# Patient Record
Sex: FEMALE | Race: BLACK OR AFRICAN AMERICAN | Employment: PART TIME | URBAN - NONMETROPOLITAN AREA
[De-identification: names, ages, dates, MRNs, and addresses within clinical notes are randomized per-mention and may not be internally consistent; named-entity substitution may affect disease eponyms.]

---

## 2017-01-03 ENCOUNTER — APPOINTMENT (OUTPATIENT)
Dept: GENERAL RADIOLOGY | Age: 42
End: 2017-01-03
Payer: MEDICARE

## 2017-01-03 ENCOUNTER — HOSPITAL ENCOUNTER (EMERGENCY)
Age: 42
Discharge: HOME | End: 2017-01-03
Attending: EMERGENCY MEDICINE
Payer: MEDICARE

## 2017-01-03 VITALS
RESPIRATION RATE: 16 BRPM | OXYGEN SATURATION: 100 % | SYSTOLIC BLOOD PRESSURE: 115 MMHG | HEIGHT: 63 IN | BODY MASS INDEX: 31.89 KG/M2 | HEART RATE: 74 BPM | WEIGHT: 180 LBS | TEMPERATURE: 98 F | DIASTOLIC BLOOD PRESSURE: 73 MMHG

## 2017-01-03 DIAGNOSIS — T14.8XXA MUSCLE STRAIN: Primary | ICD-10-CM

## 2017-01-03 PROCEDURE — 93005 ELECTROCARDIOGRAM TRACING: CPT

## 2017-01-03 PROCEDURE — 6360000002 HC RX W HCPCS: Performed by: EMERGENCY MEDICINE

## 2017-01-03 PROCEDURE — 6370000000 HC RX 637 (ALT 250 FOR IP): Performed by: EMERGENCY MEDICINE

## 2017-01-03 PROCEDURE — 99284 EMERGENCY DEPT VISIT MOD MDM: CPT

## 2017-01-03 PROCEDURE — 99282 EMERGENCY DEPT VISIT SF MDM: CPT | Performed by: EMERGENCY MEDICINE

## 2017-01-03 PROCEDURE — 96372 THER/PROPH/DIAG INJ SC/IM: CPT

## 2017-01-03 PROCEDURE — 71010 XR CHEST PORTABLE: CPT

## 2017-01-03 RX ORDER — TRIAMCINOLONE ACETONIDE 40 MG/ML
40 INJECTION, SUSPENSION INTRA-ARTICULAR; INTRAMUSCULAR ONCE
Status: COMPLETED | OUTPATIENT
Start: 2017-01-03 | End: 2017-01-03

## 2017-01-03 RX ORDER — HYDROCODONE BITARTRATE AND ACETAMINOPHEN 5; 325 MG/1; MG/1
2 TABLET ORAL ONCE
Status: COMPLETED | OUTPATIENT
Start: 2017-01-03 | End: 2017-01-03

## 2017-01-03 RX ORDER — CYCLOBENZAPRINE HCL 10 MG
10 TABLET ORAL 3 TIMES DAILY PRN
Qty: 15 TABLET | Refills: 0 | Status: SHIPPED | OUTPATIENT
Start: 2017-01-03

## 2017-01-03 RX ORDER — KETOROLAC TROMETHAMINE 30 MG/ML
60 INJECTION, SOLUTION INTRAMUSCULAR; INTRAVENOUS ONCE
Status: COMPLETED | OUTPATIENT
Start: 2017-01-03 | End: 2017-01-03

## 2017-01-03 RX ORDER — NAPROXEN 500 MG/1
500 TABLET ORAL 2 TIMES DAILY PRN
Qty: 10 TABLET | Refills: 0 | Status: SHIPPED | OUTPATIENT
Start: 2017-01-03

## 2017-01-03 RX ADMIN — HYDROCODONE BITARTRATE AND ACETAMINOPHEN 2 TABLET: 5; 325 TABLET ORAL at 15:20

## 2017-01-03 RX ADMIN — KETOROLAC TROMETHAMINE 60 MG: 30 INJECTION, SOLUTION INTRAMUSCULAR at 15:21

## 2017-01-03 RX ADMIN — TRIAMCINOLONE ACETONIDE 40 MG: 40 INJECTION, SUSPENSION INTRA-ARTICULAR; INTRAMUSCULAR at 15:21

## 2017-01-03 ASSESSMENT — ENCOUNTER SYMPTOMS
BACK PAIN: 1
VOMITING: 0
NAUSEA: 0
SHORTNESS OF BREATH: 0
COUGH: 0

## 2017-01-03 ASSESSMENT — PAIN DESCRIPTION - FREQUENCY: FREQUENCY: CONTINUOUS

## 2017-01-03 ASSESSMENT — PAIN DESCRIPTION - LOCATION: LOCATION: CHEST;ARM

## 2017-01-03 ASSESSMENT — PAIN DESCRIPTION - DESCRIPTORS: DESCRIPTORS: SPASM

## 2017-01-03 ASSESSMENT — PAIN DESCRIPTION - ORIENTATION: ORIENTATION: RIGHT

## 2017-01-03 ASSESSMENT — PAIN SCALES - GENERAL
PAINLEVEL_OUTOF10: 4
PAINLEVEL_OUTOF10: 4
PAINLEVEL_OUTOF10: 7

## 2017-01-03 ASSESSMENT — PAIN DESCRIPTION - PAIN TYPE: TYPE: ACUTE PAIN

## 2017-01-09 LAB
EKG P AXIS: 61 DEGREES
EKG P-R INTERVAL: 142 MS
EKG Q-T INTERVAL: 400 MS
EKG QRS DURATION: 82 MS
EKG QTC CALCULATION (BAZETT): 412 MS
EKG T AXIS: 53 DEGREES

## 2018-08-19 ENCOUNTER — HOSPITAL ENCOUNTER (EMERGENCY)
Age: 43
Discharge: HOME | End: 2018-08-19
Payer: MEDICARE

## 2018-08-19 VITALS
HEIGHT: 63 IN | DIASTOLIC BLOOD PRESSURE: 78 MMHG | TEMPERATURE: 98.6 F | BODY MASS INDEX: 27.64 KG/M2 | WEIGHT: 156 LBS | HEART RATE: 82 BPM | SYSTOLIC BLOOD PRESSURE: 122 MMHG | RESPIRATION RATE: 16 BRPM | OXYGEN SATURATION: 99 %

## 2018-08-19 DIAGNOSIS — Z32.01 POSITIVE PREGNANCY TEST: ICD-10-CM

## 2018-08-19 DIAGNOSIS — T78.40XA ALLERGIC REACTION, INITIAL ENCOUNTER: Primary | ICD-10-CM

## 2018-08-19 LAB
GONADOTROPIN, CHORIONIC (HCG) QUANT: 913 MIU/ML (ref 0–5.3)
HCG(URINE) PREGNANCY TEST: POSITIVE

## 2018-08-19 PROCEDURE — 99283 EMERGENCY DEPT VISIT LOW MDM: CPT | Performed by: NURSE PRACTITIONER

## 2018-08-19 PROCEDURE — 81025 URINE PREGNANCY TEST: CPT

## 2018-08-19 PROCEDURE — 84702 CHORIONIC GONADOTROPIN TEST: CPT

## 2018-08-19 PROCEDURE — 99282 EMERGENCY DEPT VISIT SF MDM: CPT

## 2018-08-19 PROCEDURE — 36415 COLL VENOUS BLD VENIPUNCTURE: CPT

## 2018-08-19 RX ORDER — METHYLPREDNISOLONE 4 MG/1
TABLET ORAL
Qty: 1 KIT | Refills: 0 | Status: SHIPPED | OUTPATIENT
Start: 2018-08-19 | End: 2018-09-09

## 2018-08-19 RX ORDER — SULFAMETHOXAZOLE AND TRIMETHOPRIM 800; 160 MG/1; MG/1
1 TABLET ORAL 2 TIMES DAILY
Qty: 20 TABLET | Refills: 0 | Status: SHIPPED | OUTPATIENT
Start: 2018-08-19 | End: 2018-08-19

## 2018-09-09 ASSESSMENT — ENCOUNTER SYMPTOMS
EYE ITCHING: 0
SORE THROAT: 0
WHEEZING: 0
COUGH: 0
CHEST TIGHTNESS: 0
STRIDOR: 0
SHORTNESS OF BREATH: 0
TROUBLE SWALLOWING: 0

## 2022-07-18 ENCOUNTER — APPOINTMENT (OUTPATIENT)
Dept: CT IMAGING | Age: 47
End: 2022-07-18
Payer: MEDICARE

## 2022-07-18 ENCOUNTER — HOSPITAL ENCOUNTER (EMERGENCY)
Age: 47
Discharge: HOME | End: 2022-07-18
Payer: MEDICARE

## 2022-07-18 VITALS
DIASTOLIC BLOOD PRESSURE: 83 MMHG | SYSTOLIC BLOOD PRESSURE: 139 MMHG | TEMPERATURE: 98.2 F | RESPIRATION RATE: 16 BRPM | HEART RATE: 78 BPM | OXYGEN SATURATION: 98 %

## 2022-07-18 DIAGNOSIS — N23 RENAL COLIC: Primary | ICD-10-CM

## 2022-07-18 LAB
ALBUMIN SERPL-MCNC: 4 G/DL (ref 3.5–5.2)
ALP BLD-CCNC: 112 U/L (ref 35–104)
ALT SERPL-CCNC: 28 U/L (ref 5–33)
ANION GAP SERPL CALCULATED.3IONS-SCNC: 9 MMOL/L (ref 7–19)
AST SERPL-CCNC: 32 U/L (ref 5–32)
BACTERIA: NEGATIVE /HPF
BASOPHILS ABSOLUTE: 0 K/UL (ref 0–0.2)
BASOPHILS RELATIVE PERCENT: 0.5 % (ref 0–1)
BILIRUB SERPL-MCNC: <0.2 MG/DL (ref 0.2–1.2)
BILIRUBIN URINE: NEGATIVE
BLOOD, URINE: ABNORMAL
BUN BLDV-MCNC: 11 MG/DL (ref 6–20)
C-REACTIVE PROTEIN: <0.3 MG/DL (ref 0–0.5)
CALCIUM SERPL-MCNC: 8.9 MG/DL (ref 8.6–10)
CHLORIDE BLD-SCNC: 104 MMOL/L (ref 98–111)
CLARITY: CLEAR
CO2: 25 MMOL/L (ref 22–29)
COLOR: YELLOW
CREAT SERPL-MCNC: 0.7 MG/DL (ref 0.5–0.9)
CRYSTALS, UA: ABNORMAL /HPF
EOSINOPHILS ABSOLUTE: 0.1 K/UL (ref 0–0.6)
EOSINOPHILS RELATIVE PERCENT: 1.4 % (ref 0–5)
EPITHELIAL CELLS, UA: 1 /HPF (ref 0–5)
GFR AFRICAN AMERICAN: >59
GFR NON-AFRICAN AMERICAN: >60
GLUCOSE BLD-MCNC: 94 MG/DL (ref 74–109)
GLUCOSE URINE: NEGATIVE MG/DL
GONADOTROPIN, CHORIONIC (HCG) QUANT: 0.1 MIU/ML (ref 0–5.3)
HCG QUALITATIVE: NEGATIVE
HCT VFR BLD CALC: 41 % (ref 37–47)
HEMOGLOBIN: 12.3 G/DL (ref 12–16)
HYALINE CASTS: 1 /HPF (ref 0–8)
IMMATURE GRANULOCYTES #: 0 K/UL
KETONES, URINE: NEGATIVE MG/DL
LEUKOCYTE ESTERASE, URINE: NEGATIVE
LIPASE: 29 U/L (ref 13–60)
LYMPHOCYTES ABSOLUTE: 1.5 K/UL (ref 1.1–4.5)
LYMPHOCYTES RELATIVE PERCENT: 22.8 % (ref 20–40)
MCH RBC QN AUTO: 27 PG (ref 27–31)
MCHC RBC AUTO-ENTMCNC: 30 G/DL (ref 33–37)
MCV RBC AUTO: 89.9 FL (ref 81–99)
MONOCYTES ABSOLUTE: 0.4 K/UL (ref 0–0.9)
MONOCYTES RELATIVE PERCENT: 6.5 % (ref 0–10)
NEUTROPHILS ABSOLUTE: 4.5 K/UL (ref 1.5–7.5)
NEUTROPHILS RELATIVE PERCENT: 68.6 % (ref 50–65)
NITRITE, URINE: NEGATIVE
PDW BLD-RTO: 13.2 % (ref 11.5–14.5)
PH UA: 6.5 (ref 5–8)
PLATELET # BLD: 217 K/UL (ref 130–400)
PMV BLD AUTO: 11.6 FL (ref 9.4–12.3)
POTASSIUM REFLEX MAGNESIUM: 3.8 MMOL/L (ref 3.5–5)
PROCALCITONIN: 0.04 NG/ML (ref 0–0.09)
PROTEIN UA: NEGATIVE MG/DL
RBC # BLD: 4.56 M/UL (ref 4.2–5.4)
RBC UA: 7 /HPF (ref 0–4)
SODIUM BLD-SCNC: 138 MMOL/L (ref 136–145)
SPECIFIC GRAVITY UA: 1.02 (ref 1–1.03)
TOTAL PROTEIN: 7.4 G/DL (ref 6.6–8.7)
UROBILINOGEN, URINE: 1 E.U./DL
WBC # BLD: 6.5 K/UL (ref 4.8–10.8)
WBC UA: 1 /HPF (ref 0–5)

## 2022-07-18 PROCEDURE — 74150 CT ABDOMEN W/O CONTRAST: CPT

## 2022-07-18 PROCEDURE — 86140 C-REACTIVE PROTEIN: CPT

## 2022-07-18 PROCEDURE — 74150 CT ABDOMEN W/O CONTRAST: CPT | Performed by: RADIOLOGY

## 2022-07-18 PROCEDURE — 84145 PROCALCITONIN (PCT): CPT

## 2022-07-18 PROCEDURE — 36415 COLL VENOUS BLD VENIPUNCTURE: CPT

## 2022-07-18 PROCEDURE — 85025 COMPLETE CBC W/AUTO DIFF WBC: CPT

## 2022-07-18 PROCEDURE — 6370000000 HC RX 637 (ALT 250 FOR IP): Performed by: PHYSICIAN ASSISTANT

## 2022-07-18 PROCEDURE — 83690 ASSAY OF LIPASE: CPT

## 2022-07-18 PROCEDURE — 84703 CHORIONIC GONADOTROPIN ASSAY: CPT

## 2022-07-18 PROCEDURE — 81001 URINALYSIS AUTO W/SCOPE: CPT

## 2022-07-18 PROCEDURE — 84702 CHORIONIC GONADOTROPIN TEST: CPT

## 2022-07-18 PROCEDURE — 80053 COMPREHEN METABOLIC PANEL: CPT

## 2022-07-18 PROCEDURE — 99284 EMERGENCY DEPT VISIT MOD MDM: CPT

## 2022-07-18 RX ORDER — TAMSULOSIN HYDROCHLORIDE 0.4 MG/1
0.4 CAPSULE ORAL ONCE
Status: COMPLETED | OUTPATIENT
Start: 2022-07-18 | End: 2022-07-18

## 2022-07-18 RX ORDER — TAMSULOSIN HYDROCHLORIDE 0.4 MG/1
0.4 CAPSULE ORAL DAILY
Qty: 30 CAPSULE | Refills: 0 | Status: SHIPPED | OUTPATIENT
Start: 2022-07-18

## 2022-07-18 RX ADMIN — TAMSULOSIN HYDROCHLORIDE 0.4 MG: 0.4 CAPSULE ORAL at 19:34

## 2022-07-18 ASSESSMENT — ENCOUNTER SYMPTOMS
ABDOMINAL DISTENTION: 0
ABDOMINAL PAIN: 0
SHORTNESS OF BREATH: 0
EYE DISCHARGE: 0
SORE THROAT: 0
RHINORRHEA: 0
PHOTOPHOBIA: 0
EYE PAIN: 0
COLOR CHANGE: 0
APNEA: 0
BACK PAIN: 0
COUGH: 0
NAUSEA: 0

## 2022-07-18 NOTE — ED PROVIDER NOTES
[facility] EMERGENCY DEPT  eMERGENCYdEPARTMENT eNCOUnter      Pt Name: Ritu Aldrich  MRN: [id number]  [place] 1975  Date of evaluation: 7/18/2022  Provider:ANKITA Cramer    CHIEF COMPLAINT       Chief Complaint   Patient presents with    Hematuria         HISTORY OF PRESENT ILLNESS  (Location/Symptom, Timing/Onset, Context/Setting, Quality, Duration, Modifying Factors, Severity.)   Ritu Aldrich is a 42 y.o. female who presents to the emergency department with complaints of hematuria and suprapubic pain multiple rounds of ABX. She had stones found within bilateral kidneys 7/12/22 on CT continues to hurt and bleed. She denies STD potential. Doesn't correlate with last menses. Has appt this Thursday with [facility]. Has been urinating through strainer no stone has passed. HPI    Nursing Notes were reviewed and I agree. REVIEW OF SYSTEMS    (2-9 systems for level 4, 10 or more for level 5)     Review of Systems   Constitutional:  Negative for activity change, appetite change, chills and fever. HENT:  Negative for congestion, postnasal drip, rhinorrhea and sore throat. Eyes:  Negative for photophobia, pain, discharge and visual disturbance. Respiratory:  Negative for apnea, cough and shortness of breath. Cardiovascular:  Negative for chest pain and leg swelling. Gastrointestinal:  Negative for abdominal distention, abdominal pain and nausea. Genitourinary:  Positive for dysuria, hematuria and pelvic pain. Negative for vaginal bleeding. Musculoskeletal:  Negative for arthralgias, back pain, joint swelling, neck pain and neck stiffness. Skin:  Negative for color change and rash. Neurological:  Negative for dizziness, syncope, facial asymmetry and headaches. Hematological:  Negative for adenopathy. Does not bruise/bleed easily. Psychiatric/Behavioral:  Negative for agitation, behavioral problems and confusion.        Except as noted above the remainder of the review of systems was reviewed and negative. PAST MEDICAL HISTORY     Past Medical History:   Diagnosis Date    Chronic back pain          SURGICAL HISTORY       Past Surgical History:   Procedure Laterality Date     SECTION      3    OVARIAN CYST REMOVAL           CURRENT MEDICATIONS       Discharge Medication List as of 2022  7:55 PM        CONTINUE these medications which have NOT CHANGED    Details   cyclobenzaprine (FLEXERIL) 10 MG tablet Take 1 tablet by mouth 3 times daily as needed (muscle pain), Disp-15 tablet, R-0      naproxen (NAPROSYN) 500 MG tablet Take 1 tablet by mouth 2 times daily as needed for Pain, Disp-10 tablet, R-0             ALLERGIES     Doxycycline and Other    FAMILY HISTORY     No family history on file. SOCIAL HISTORY       Social History     Socioeconomic History    Marital status: Single   Tobacco Use    Smoking status: Never    Smokeless tobacco: Never   Substance and Sexual Activity    Alcohol use: No    Drug use: No       SCREENINGS    Harris Coma Scale  Eye Opening: Spontaneous  Best Verbal Response: Oriented  Best Motor Response: Obeys commands  Heartwell Coma Scale Score: 15      PHYSICAL EXAM    (up to 7 forlevel 4, 8 or more for level 5)     ED Triage Vitals [22 1503]   BP Temp Temp Source Heart Rate Resp SpO2 Height Weight   139/83 98.2 °F (36.8 °C) Oral 78 16 98 % -- --       Physical Exam  Vitals and nursing note reviewed. Constitutional:       General: She is not in acute distress. Appearance: Normal appearance. She is well-developed. She is not diaphoretic. HENT:      Head: Normocephalic and atraumatic. Right Ear: Tympanic membrane, ear canal and external ear normal.      Left Ear: Tympanic membrane, ear canal and external ear normal.      Nose: Nose normal.      Mouth/Throat:      Mouth: Mucous membranes are moist.      Pharynx: No oropharyngeal exudate. Eyes:      General:         Right eye: No discharge. Left eye: No discharge. Pupils: Pupils are equal, round, and reactive to light. Neck:      Thyroid: No thyromegaly. Cardiovascular:      Rate and Rhythm: Normal rate and regular rhythm. Pulses: Normal pulses. Heart sounds: Normal heart sounds. No murmur heard. No friction rub. Pulmonary:      Effort: Pulmonary effort is normal. No respiratory distress. Breath sounds: Normal breath sounds. No stridor. No wheezing. Abdominal:      General: Abdomen is flat. Bowel sounds are normal. There is no distension. Palpations: Abdomen is soft. Tenderness: There is no abdominal tenderness. There is right CVA tenderness and left CVA tenderness. Comments: Suprapubic discomfort   Musculoskeletal:         General: Normal range of motion. Cervical back: Normal range of motion and neck supple. Skin:     General: Skin is warm and dry. Capillary Refill: Capillary refill takes less than 2 seconds. Findings: No rash. Neurological:      Mental Status: She is alert and oriented to person, place, and time. Cranial Nerves: No cranial nerve deficit. Sensory: No sensory deficit. Coordination: Coordination normal.   Psychiatric:         Mood and Affect: Mood normal.         Behavior: Behavior normal.         Thought Content: Thought content normal.         Judgment: Judgment normal.         DIAGNOSTIC RESULTS     RADIOLOGY:   Non-plain film images such as CT, Ultrasound and MRI are read by the radiologist. Plain radiographic images are visualized and preliminarilyinterpreted by No att. providers found with the below findings:      Interpretation per the Radiologist below, if available at the time of this note:    CT KIDNEY WO CONTRAST   Final Result   1. 6 mm nonobstructing left renal stone, lower pole   2. Punctate nonobstructing left renal stone, lower pole   3. The appendix is not visualized   Recommendation: Follow up as clinically indicated.    All CT scans at this facility utilize dose modulation, iterative reconstruction, and/or weight based dosing when appropriate to reduce radiation dose to as low as reasonably achievable. Electronically Signed by Annabel Esparza at 18-Jul-2022 08:23:17 PM                   LABS:  Labs Reviewed   CBC WITH AUTO DIFFERENTIAL - Abnormal; Notable for the following components:       Result Value    MCHC 30.0 (*)     Neutrophils % 68.6 (*)     All other components within normal limits   COMPREHENSIVE METABOLIC PANEL W/ REFLEX TO MG FOR LOW K - Abnormal; Notable for the following components:    Alkaline Phosphatase 112 (*)     All other components within normal limits   URINALYSIS WITH REFLEX TO CULTURE - Abnormal; Notable for the following components:    Blood, Urine TRACE (*)     All other components within normal limits   MICROSCOPIC URINALYSIS - Abnormal; Notable for the following components:    Bacteria, UA NEGATIVE (*)     Crystals, UA NEG (*)     RBC, UA 7 (*)     All other components within normal limits   LIPASE   HCG, QUANTITATIVE, PREGNANCY   PROCALCITONIN   C-REACTIVE PROTEIN   HCG, SERUM, QUALITATIVE       All other labs were within normal range or notreturned as of this dictation. RE-ASSESSMENT          EMERGENCY DEPARTMENT COURSE and DIFFERENTIAL DIAGNOSIS/MDM:   Vitals:    Vitals:    07/18/22 1503   BP: 139/83   Pulse: 78   Resp: 16   Temp: 98.2 °F (36.8 °C)   TempSrc: Oral   SpO2: 98%           MDM  Plan for flomax. 6mm stone within renal pole no other acute findings. She can continue with NSAIDS at home keep her appt for Thursday with urology as planned. No UTI findings today normal Cr BUN. PROCEDURES:    Procedures      FINAL IMPRESSION      1.  Renal colic          DISPOSITION/PLAN   DISPOSITION Decision To Discharge 07/18/2022 07:48:14 PM      PATIENT REFERRED TO:  [facility]  Giovany Palacio  [phone]    If symptoms worsen    DISCHARGE MEDICATIONS:  Discharge Medication List as of 7/18/2022  7:55 PM START taking these medications    Details   tamsulosin (FLOMAX) 0.4 MG capsule Take 1 capsule by mouth in the morning., Disp-30 capsule, R-0Normal             (Please note that portions of this note were completed with a voice recognition program.  Efforts were made to edit the dictations but occasionallywords are mis-transcribed.)    Madeline Mortimer, [address], [address]  07/19/22 [id number]

## 2022-07-22 PROBLEM — E66.811 CLASS 1 OBESITY DUE TO EXCESS CALORIES WITHOUT SERIOUS COMORBIDITY WITH BODY MASS INDEX (BMI) OF 31.0 TO 31.9 IN ADULT: Status: ACTIVE | Noted: 2022-07-22

## 2023-01-13 ENCOUNTER — TELEPHONE (OUTPATIENT)
Dept: INTERNAL MEDICINE | Facility: CLINIC | Age: 48
End: 2023-01-13

## 2023-01-13 NOTE — TELEPHONE ENCOUNTER
Patient was informed she states she will go to [facility] and have it done

## 2023-01-13 NOTE — TELEPHONE ENCOUNTER
Caller: Mercedes Wren    Relationship: Self    Best call back number: [phone]    What medication are you requesting: ANTIBIOTIC    What are your current symptoms: URINE HAS A SMELL, URINE IS CLOUDY, BURNING SENSATION     How long have you been experiencing symptoms: TWO DAYS    Have you had these symptoms before:    [x] Yes  [] No    Have you been treated for these symptoms before:   [x] Yes  [] No    If a prescription is needed, what is your preferred pharmacy and phone number: [facility] #[id number] - [place], [place] - [address]  [address]  - [phone] [facility] [phone]      Additional notes: PATIENT BELIEVES SHE HAS A UTI. PLEASE CALL BACK WHEN SOMETHING IS CALLED IN.

## 2023-04-03 ENCOUNTER — TELEPHONE (OUTPATIENT)
Dept: INTERNAL MEDICINE | Facility: CLINIC | Age: 48
End: 2023-04-03

## 2023-04-03 NOTE — TELEPHONE ENCOUNTER
Caller: Mercedes Wren    Relationship: Self    Best call back number: [phone]    What orders are you requesting (i.e. lab or imaging):   LAB    In what timeframe would the patient need to come in:   ASAP    Where will you receive your lab/imaging services:   [facility]     Additional notes:   PATIENT SAID IT IS BLOOD IN URINE.    PLEASE CONTACT PATIENT AND ADVISE ONCE ORDERS ENTERED.

## 2023-09-01 ENCOUNTER — TELEPHONE (OUTPATIENT)
Dept: INTERNAL MEDICINE | Facility: CLINIC | Age: 48
End: 2023-09-01
Payer: MEDICARE

## 2023-09-01 NOTE — TELEPHONE ENCOUNTER
PT CALLED STATING THAT SHE IS HAVING RIGHT HAND TINGLING, ALMOST ASLEEP., PAIN IN ELBOW AND WRIST. I ENCOURAGED HER TO GO TO URGENT CARE OR THE E D TO BE CHECKED.

## 2023-09-07 ENCOUNTER — OFFICE VISIT (OUTPATIENT)
Dept: INTERNAL MEDICINE | Facility: CLINIC | Age: 48
End: 2023-09-07
Payer: MEDICARE

## 2023-09-07 VITALS
BODY MASS INDEX: 23.83 KG/M2 | HEART RATE: 81 BPM | OXYGEN SATURATION: 98 % | DIASTOLIC BLOOD PRESSURE: 70 MMHG | HEIGHT: 63 IN | RESPIRATION RATE: 16 BRPM | SYSTOLIC BLOOD PRESSURE: 107 MMHG | WEIGHT: 134.5 LBS

## 2023-09-07 DIAGNOSIS — F41.0 PANIC ATTACKS: ICD-10-CM

## 2023-09-07 DIAGNOSIS — F41.1 GENERALIZED ANXIETY DISORDER: ICD-10-CM

## 2023-09-07 PROCEDURE — 1160F RVW MEDS BY RX/DR IN RCRD: CPT | Performed by: INTERNAL MEDICINE

## 2023-09-07 PROCEDURE — 99214 OFFICE O/P EST MOD 30 MIN: CPT | Performed by: INTERNAL MEDICINE

## 2023-09-07 PROCEDURE — 1159F MED LIST DOCD IN RCRD: CPT | Performed by: INTERNAL MEDICINE

## 2023-09-07 RX ORDER — BUSPIRONE HYDROCHLORIDE 10 MG/1
10 TABLET ORAL 2 TIMES DAILY
Qty: 120 TABLET | Refills: 1 | Status: SHIPPED | OUTPATIENT
Start: 2023-09-07 | End: 2023-09-07 | Stop reason: SDUPTHER

## 2023-09-07 RX ORDER — BUSPIRONE HYDROCHLORIDE 10 MG/1
20 TABLET ORAL 2 TIMES DAILY
Qty: 120 TABLET | Refills: 1 | Status: SHIPPED | OUTPATIENT
Start: 2023-09-07

## 2023-09-07 RX ORDER — VENLAFAXINE HYDROCHLORIDE 75 MG/1
75 CAPSULE, EXTENDED RELEASE ORAL DAILY
Qty: 30 CAPSULE | Refills: 3 | Status: SHIPPED | OUTPATIENT
Start: 2023-09-07

## 2023-09-07 NOTE — PROGRESS NOTES
CC: anxiety    History:  Mercedes Amezquita is a 48 y.o. female   She notes she has still had significant issues with anxiety that have affected her both at home and at work.  She has tolerated venlafaxine better than Cymbalta, but does not feel anxiety has improved much.  She has had some response with BuSpar in response to managing her panic attacks.  She remains open to seeing a counselor.      ROS:  Review of Systems   Psychiatric/Behavioral:  Negative for dysphoric mood. The patient is nervous/anxious.       reports that she has never smoked. She has never been exposed to tobacco smoke. She has never used smokeless tobacco. She reports that she does not drink alcohol and does not use drugs.      Current Outpatient Medications:     busPIRone (BUSPAR) 10 MG tablet, Take 1 tablet by mouth 2 (Two) Times a Day., Disp: 120 tablet, Rfl: 1    butalbital-acetaminophen-caffeine (FIORICET, ESGIC) -40 MG per tablet, Take 1 tablet by mouth As Needed., Disp: , Rfl:     diclofenac (VOLTAREN) 50 MG EC tablet, Take 1 tablet by mouth 2 (Two) Times a Day., Disp: , Rfl:     polyethylene glycol (MiraLax) 17 GM/SCOOP powder, Take 17 g by mouth Daily., Disp: 850 g, Rfl: 0    pregabalin (LYRICA) 50 MG capsule, Take 1 capsule by mouth 2 (Two) Times a Day., Disp: , Rfl:     Sennosides (Senna) 8.6 MG capsule, Take 17.2 mg by mouth 2 (Two) Times a Day As Needed (constipation)., Disp: 30 each, Rfl: 3    tamsulosin (FLOMAX) 0.4 MG capsule 24 hr capsule, Take 1 capsule by mouth Every Morning., Disp: , Rfl:     tiZANidine (ZANAFLEX) 4 MG tablet, Take 1 tablet by mouth Every 8 (Eight) Hours As Needed., Disp: , Rfl:     topiramate (TOPAMAX) 25 MG tablet, Take 1 tablet by mouth As Needed., Disp: , Rfl:     traMADol (ULTRAM) 50 MG tablet, Take 1 tablet by mouth As Needed., Disp: , Rfl:     venlafaxine XR (Effexor XR) 37.5 MG 24 hr capsule, Take 1 capsule by mouth Daily., Disp: 30 capsule, Rfl: 3    OBJECTIVE:  /70 (BP Location: Left arm,  "Patient Position: Sitting, Cuff Size: Adult)   Pulse 81   Resp 16   Ht 160 cm (63\")   Wt 61 kg (134 lb 8 oz)   LMP  (LMP Unknown)   SpO2 98%   BMI 23.83 kg/m²    Physical Exam  Constitutional:       General: She is not in acute distress.  Pulmonary:      Effort: Pulmonary effort is normal. No respiratory distress.   Neurological:      Mental Status: She is alert and oriented to person, place, and time.       Assessment/Plan     Diagnoses and all orders for this visit:    1. Generalized anxiety disorder  2. Panic attacks    -     Ambulatory Referral to Psychology  -     busPIRone (BUSPAR) 10 MG tablet; Take 2 tablets by mouth 2 (Two) Times a Day.  Dispense: 120 tablet; Refill: 1  -     venlafaxine XR (Effexor XR) 75 MG 24 hr capsule; Take 1 capsule by mouth Daily.  Dispense: 30 capsule; Refill: 3  ferral to Psychology  Increase effexor to 75mg and buspar to 20mg BID. Refer to counseling given no discernible improvement.     An After Visit Summary was printed and given to the patient at discharge.  Return in about 2 months (around 11/7/2023) for Recheck.      Krishna Villasenor D.O. 9/7/2023   Electronically signed.  "

## 2023-10-08 DIAGNOSIS — F41.1 GENERALIZED ANXIETY DISORDER: ICD-10-CM

## 2023-10-08 RX ORDER — VENLAFAXINE HYDROCHLORIDE 37.5 MG/1
37.5 CAPSULE, EXTENDED RELEASE ORAL DAILY
Qty: 30 CAPSULE | Refills: 1 | OUTPATIENT
Start: 2023-10-08

## 2023-11-08 ENCOUNTER — OFFICE VISIT (OUTPATIENT)
Dept: INTERNAL MEDICINE | Facility: CLINIC | Age: 48
End: 2023-11-08
Payer: MEDICARE

## 2023-11-08 VITALS
SYSTOLIC BLOOD PRESSURE: 125 MMHG | HEART RATE: 68 BPM | DIASTOLIC BLOOD PRESSURE: 85 MMHG | RESPIRATION RATE: 16 BRPM | HEIGHT: 63 IN | BODY MASS INDEX: 25.78 KG/M2 | OXYGEN SATURATION: 98 % | WEIGHT: 145.5 LBS

## 2023-11-08 DIAGNOSIS — F41.1 GENERALIZED ANXIETY DISORDER: Primary | ICD-10-CM

## 2023-11-08 PROCEDURE — 1159F MED LIST DOCD IN RCRD: CPT | Performed by: INTERNAL MEDICINE

## 2023-11-08 PROCEDURE — 1160F RVW MEDS BY RX/DR IN RCRD: CPT | Performed by: INTERNAL MEDICINE

## 2023-11-08 PROCEDURE — 99213 OFFICE O/P EST LOW 20 MIN: CPT | Performed by: INTERNAL MEDICINE

## 2023-11-08 RX ORDER — VENLAFAXINE HYDROCHLORIDE 75 MG/1
75 CAPSULE, EXTENDED RELEASE ORAL DAILY
Qty: 90 CAPSULE | Refills: 1 | Status: SHIPPED | OUTPATIENT
Start: 2023-11-08

## 2023-11-08 NOTE — PROGRESS NOTES
CC: f/u anxiety    History:  Mercedes Amezquita is a 48 y.o. female   She notes she has been doing much better. She has tolerated venlafaxine and feels that her anxiety has improved. She is now using buspirone only as needed. She has been grieving the loss of several folks, but is excited about the birth of her first granddaughter in January.      ROS:  Review of Systems   Psychiatric/Behavioral:  Negative for dysphoric mood. The patient is not nervous/anxious.         reports that she has never smoked. She has never been exposed to tobacco smoke. She has never used smokeless tobacco. She reports that she does not drink alcohol and does not use drugs.      Current Outpatient Medications:     busPIRone (BUSPAR) 10 MG tablet, Take 2 tablets by mouth 2 (Two) Times a Day., Disp: 120 tablet, Rfl: 1    butalbital-acetaminophen-caffeine (FIORICET, ESGIC) -40 MG per tablet, Take 1 tablet by mouth As Needed., Disp: , Rfl:     diclofenac (VOLTAREN) 50 MG EC tablet, Take 1 tablet by mouth 2 (Two) Times a Day., Disp: , Rfl:     polyethylene glycol (MiraLax) 17 GM/SCOOP powder, Take 17 g by mouth Daily., Disp: 850 g, Rfl: 0    pregabalin (LYRICA) 50 MG capsule, Take 1 capsule by mouth 2 (Two) Times a Day., Disp: , Rfl:     Sennosides (Senna) 8.6 MG capsule, Take 17.2 mg by mouth 2 (Two) Times a Day As Needed (constipation)., Disp: 30 each, Rfl: 3    tamsulosin (FLOMAX) 0.4 MG capsule 24 hr capsule, Take 1 capsule by mouth Every Morning., Disp: , Rfl:     tiZANidine (ZANAFLEX) 4 MG tablet, Take 1 tablet by mouth Every 8 (Eight) Hours As Needed., Disp: , Rfl:     topiramate (TOPAMAX) 25 MG tablet, Take 1 tablet by mouth As Needed., Disp: , Rfl:     traMADol (ULTRAM) 50 MG tablet, Take 1 tablet by mouth As Needed., Disp: , Rfl:     venlafaxine XR (Effexor XR) 75 MG 24 hr capsule, Take 1 capsule by mouth Daily., Disp: 30 capsule, Rfl: 3    OBJECTIVE:  /85 (BP Location: Left arm, Patient Position: Sitting, Cuff Size: Adult)   " Pulse 68   Resp 16   Ht 160 cm (63\")   Wt 66 kg (145 lb 8 oz)   LMP  (LMP Unknown)   SpO2 98%   BMI 25.77 kg/m²    Physical Exam  Constitutional:       General: She is not in acute distress.  Pulmonary:      Effort: Pulmonary effort is normal. No respiratory distress.   Neurological:      Mental Status: She is alert and oriented to person, place, and time.   Psychiatric:         Mood and Affect: Mood normal.         Behavior: Behavior normal.         Assessment/Plan     Diagnoses and all orders for this visit:    1. Generalized anxiety disorder (Primary)  -     venlafaxine XR (Effexor XR) 75 MG 24 hr capsule; Take 1 capsule by mouth Daily.  Dispense: 90 capsule; Refill: 1  Stable on Effexor, which is changed to a 90 day prescription. Continue prn buspar and can still consider counseling as this could still be very effective for her.       An After Visit Summary was printed and given to the patient at discharge.  Return in about 6 months (around 5/8/2024) for Recheck.      Krishna Villasenor D.O. 11/8/2023   Electronically signed.  "

## 2023-11-21 ENCOUNTER — APPOINTMENT (OUTPATIENT)
Dept: CT IMAGING | Age: 48
End: 2023-11-21
Payer: COMMERCIAL

## 2023-11-21 ENCOUNTER — APPOINTMENT (OUTPATIENT)
Dept: GENERAL RADIOLOGY | Age: 48
End: 2023-11-21
Payer: COMMERCIAL

## 2023-11-21 ENCOUNTER — HOSPITAL ENCOUNTER (EMERGENCY)
Age: 48
Discharge: HOME | End: 2023-11-21
Payer: COMMERCIAL

## 2023-11-21 VITALS
OXYGEN SATURATION: 100 % | DIASTOLIC BLOOD PRESSURE: 69 MMHG | BODY MASS INDEX: 22.68 KG/M2 | SYSTOLIC BLOOD PRESSURE: 125 MMHG | HEIGHT: 63 IN | WEIGHT: 128 LBS | TEMPERATURE: 98.7 F | HEART RATE: 78 BPM | RESPIRATION RATE: 17 BRPM
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
## 2023-11-21 DIAGNOSIS — S39.012A STRAIN OF LUMBAR REGION, INITIAL ENCOUNTER: ICD-10-CM

## 2023-11-21 LAB — HCG UR QL: NEGATIVE

## 2023-11-21 PROCEDURE — 70450 CT HEAD/BRAIN W/O DYE: CPT

## 2023-11-21 PROCEDURE — 99284 EMERGENCY DEPT VISIT MOD MDM: CPT

## 2023-11-21 PROCEDURE — 84703 CHORIONIC GONADOTROPIN ASSAY: CPT

## 2023-11-21 PROCEDURE — 73560 X-RAY EXAM OF KNEE 1 OR 2: CPT

## 2023-11-21 PROCEDURE — 73552 X-RAY EXAM OF FEMUR 2/>: CPT

## 2023-11-21 PROCEDURE — 72125 CT NECK SPINE W/O DYE: CPT

## 2023-11-21 PROCEDURE — 71045 X-RAY EXAM CHEST 1 VIEW: CPT

## 2023-11-21 PROCEDURE — 72131 CT LUMBAR SPINE W/O DYE: CPT

## 2023-11-21 PROCEDURE — 73030 X-RAY EXAM OF SHOULDER: CPT

## 2023-11-21 RX ORDER — CYCLOBENZAPRINE HCL 10 MG
10 TABLET ORAL 3 TIMES DAILY PRN
Qty: 21 TABLET | Refills: 0 | Status: SHIPPED | OUTPATIENT
Start: 2023-11-21 | End: 2023-12-01

## 2023-11-21 ASSESSMENT — ENCOUNTER SYMPTOMS
COUGH: 0
EYE DISCHARGE: 0
BACK PAIN: 1
SHORTNESS OF BREATH: 0
APNEA: 0
NAUSEA: 0
RHINORRHEA: 0
ABDOMINAL PAIN: 0
EYE PAIN: 0
SORE THROAT: 0
ABDOMINAL DISTENTION: 0
PHOTOPHOBIA: 0
COLOR CHANGE: 0
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
## 2023-11-21 NOTE — DISCHARGE INSTRUCTIONS
Tylenol motrin heating pad ice alternating  No acute findings on work up.   Plan for rest and 72 hrs observation

## 2023-12-08 ENCOUNTER — TRANSCRIBE ORDERS (OUTPATIENT)
Dept: PHYSICAL THERAPY | Facility: CLINIC | Age: 48
End: 2023-12-08
Payer: MEDICARE

## 2023-12-21 ENCOUNTER — TREATMENT (OUTPATIENT)
Dept: PHYSICAL THERAPY | Facility: CLINIC | Age: 48
End: 2023-12-21
Payer: MEDICARE

## 2023-12-21 DIAGNOSIS — S13.4XXS WHIPLASH INJURY TO NECK, SEQUELA: Primary | ICD-10-CM

## 2023-12-21 DIAGNOSIS — M54.6 THORACIC SPINE PAIN: ICD-10-CM

## 2023-12-21 DIAGNOSIS — M54.40 ACUTE BILATERAL LOW BACK PAIN WITH SCIATICA, SCIATICA LATERALITY UNSPECIFIED: ICD-10-CM
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
## 2023-12-22 ENCOUNTER — TELEPHONE (OUTPATIENT)
Dept: INTERNAL MEDICINE | Facility: CLINIC | Age: 48
End: 2023-12-22
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
## 2024-01-02 ENCOUNTER — TREATMENT (OUTPATIENT)
Dept: PHYSICAL THERAPY | Facility: CLINIC | Age: 49
End: 2024-01-02
Payer: MEDICARE

## 2024-01-02 DIAGNOSIS — M54.6 THORACIC SPINE PAIN: ICD-10-CM

## 2024-01-02 DIAGNOSIS — M54.40 ACUTE BILATERAL LOW BACK PAIN WITH SCIATICA, SCIATICA LATERALITY UNSPECIFIED: ICD-10-CM

## 2024-01-02 DIAGNOSIS — S13.4XXS WHIPLASH INJURY TO NECK, SEQUELA: Primary | ICD-10-CM
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
## 2024-01-09 ENCOUNTER — TREATMENT (OUTPATIENT)
Dept: PHYSICAL THERAPY | Facility: CLINIC | Age: 49
End: 2024-01-09
Payer: MEDICARE

## 2024-01-09 DIAGNOSIS — M54.40 ACUTE BILATERAL LOW BACK PAIN WITH SCIATICA, SCIATICA LATERALITY UNSPECIFIED: ICD-10-CM

## 2024-01-09 DIAGNOSIS — M54.6 THORACIC SPINE PAIN: ICD-10-CM

## 2024-01-09 DIAGNOSIS — S13.4XXS WHIPLASH INJURY TO NECK, SEQUELA: Primary | ICD-10-CM
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
## 2024-01-18 ENCOUNTER — TREATMENT (OUTPATIENT)
Dept: PHYSICAL THERAPY | Facility: CLINIC | Age: 49
End: 2024-01-18
Payer: MEDICARE

## 2024-01-18 DIAGNOSIS — M54.41 ACUTE BILATERAL LOW BACK PAIN WITH BILATERAL SCIATICA: ICD-10-CM

## 2024-01-18 DIAGNOSIS — M54.42 ACUTE BILATERAL LOW BACK PAIN WITH BILATERAL SCIATICA: ICD-10-CM

## 2024-01-18 DIAGNOSIS — M54.6 THORACIC SPINE PAIN: ICD-10-CM

## 2024-01-18 DIAGNOSIS — S13.4XXS WHIPLASH INJURY TO NECK, SEQUELA: Primary | ICD-10-CM
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
## 2024-01-23 ENCOUNTER — TREATMENT (OUTPATIENT)
Dept: PHYSICAL THERAPY | Facility: CLINIC | Age: 49
End: 2024-01-23
Payer: MEDICARE

## 2024-01-23 DIAGNOSIS — S13.4XXS WHIPLASH INJURY TO NECK, SEQUELA: Primary | ICD-10-CM

## 2024-01-23 DIAGNOSIS — M54.40 ACUTE BILATERAL LOW BACK PAIN WITH SCIATICA, SCIATICA LATERALITY UNSPECIFIED: ICD-10-CM

## 2024-01-23 DIAGNOSIS — M54.6 THORACIC SPINE PAIN: ICD-10-CM
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
## 2024-01-23 NOTE — Clinical Note
PROGRESS NOTE -- min progress d/t lack of attendance, restricted by transport and weather -- reports compliance 1x/week with HEP

## 2024-01-23 NOTE — PROGRESS NOTES
"                                                                Physical Therapy Treatment Note  [address] [place] [address]    Patient: Mercedes Amezquita                                                 Visit Date: 2024  :     1975    Referring practitioner:    Shay Espinal MD  Date of Initial Visit:          Type: THERAPY  Noted: 2023    Patient seen for 5 sessions    Visit Diagnoses:    ICD-10-CM ICD-9-CM   1. Whiplash injury to neck, sequela  S13.4XXS 905.7   2. Thoracic spine pain  M54.6 724.1   3. Acute bilateral low back pain with sciatica, sciatica laterality unspecified  M54.40 724.2     724.3     SUBJECTIVE     Subjective: She reports that she had a \"warm, burning sensation\" after last session, was more of a discomfort. She reports grocery shopping increased her pain, even lifting bread, and had to have her family unload the groceries. She reports if she's flared up, she can't drive and must bum a ride. She reports it interferes with her sleep. \"I'm still the same, but I feel like it's a process.\" Reports \"stinging\" with a few of the movements today.     PAIN: 7/10     OBJECTIVE     Objective     Therapeutic Exercises    00293 Units Comments   Figure 4 and foot on ground piriformis stretches - actively   Reports unable to isolate intended mm    B cervical lateral rotation AROM   See goals                   Timed Minutes 15     Therapeutic Activities    68549 Comments   Assessed all goals for progress note See goals table    Obtained updated NDI, MATTHIAS See goals table    Assessed lumbar flexion and educated on ergonomics with ADLs  See education table and goals table           Timed Minutes 15     Manual Therapy     29055  Comments   STM with massage cream, massage chair Lumbar/cervical paraspinals, gr 1 VERY gentle       Timed Minutes 10       Therapy Education/Self Care [id number]   Education offered today Desensitization techniques, POC, HEP   [employer] Code [email or web address]   Ongoing HEP "   Date: 12/21/2023  Prepared by: Pauline Smith     Exercises  - Sternocleidomastoid Stretch  - 1 x daily - 7 x weekly - 4 reps - 15-30 sec hold  - Seated Cervical Sidebending Stretch  - 1 x daily - 7 x weekly - 4 reps - 15-30 sec hold  - Supine Piriformis Stretch with Foot on Ground  - 1 x daily - 7 x weekly - 3 reps - 30 hold  - Hooklying Single Knee to Chest Stretch  - 1 x daily - 7 x weekly - 3 reps - 30 hold  - Supine Lower Trunk Rotation  - 1 x daily - 7 x weekly - 2 sets - 10 reps - 3 hold   Timed Minutes      Total Timed Treatment:     40   mins  Total Time of Visit:             40   mins         ASSESSMENT/PLAN     GOALS  Goals                                    Progress Note due by 2/22/24                                                    Recert due by 3/19/24   STG by: 4 weeks Comments Date Status   Improve mobility through thoracic and CT junction Deferred today d/t time constraints 1/23 ongoing   Decreased muscle guarding  throughout neck and shoulder girdle Very TTP throughout, guarded and inflammation noted  1/23 ongoing    Decreased muscle guarding  throughout gluteals and piriformis Deferred d/t time constraints today. Difficulty isolating piriformis with stretches 1/23 ongoing   LTG by: 8 weeks         Reports no radicular symptoms for a week unchanged 1/23 ongoing   Improve cervical rotation preeti to 60 deg with no pain L 48 deg R 38 deg  1/23 ongoing   Able to return from fwd lumbar flexion to neutral without gowers maneuver Reports feeling pressure in lumbar, though able to return to neutral without compensation  1/23 MET   Understands improved ergonomics for work and HEP for flexibility and posture Reports compliance at least 1x/week  1/23 ongoing    Improve NDI score to 10 or less  27 on 1/23/2024 1/23  ongoing    Improve RENETTA score to 10 or less  29/50 on 1/23/2024 1/23 ongoing     Assessment/Plan     ASSESSMENT: Though pt reports feeling grossly unchanged since her eval and has attended only  4 sessions as of today, she has met one of her nine goals. She was a few min late to the clinic this session, though full session able to be provided. She continues to present very TTP and with heightened nervous system response. Reviewed desensitization and reinforced importance of performing this at home to allow better progression with PT. She would benefit from continued skilled PT to address these deficits and improve her QoL.    PLAN: Consider very gentle movements, such as LTR, and self-stretches next session. Continue with focus on cervical pain, may introduce very gentle scapular strengthening, if tolerated. May consider bolstering HEP.     SIGNATURE: Jenny Alejandre PTA, [place] License #: [id number]  Electronically Signed on 1/23/2024        [place]. [id number]  [phone]

## 2024-01-30 ENCOUNTER — TREATMENT (OUTPATIENT)
Dept: PHYSICAL THERAPY | Facility: CLINIC | Age: 49
End: 2024-01-30
Payer: MEDICARE

## 2024-01-30 DIAGNOSIS — S13.4XXS WHIPLASH INJURY TO NECK, SEQUELA: Primary | ICD-10-CM

## 2024-01-30 DIAGNOSIS — M54.41 ACUTE BILATERAL LOW BACK PAIN WITH BILATERAL SCIATICA: ICD-10-CM

## 2024-01-30 DIAGNOSIS — M54.42 ACUTE BILATERAL LOW BACK PAIN WITH BILATERAL SCIATICA: ICD-10-CM

## 2024-01-30 DIAGNOSIS — M54.6 THORACIC SPINE PAIN: ICD-10-CM

## 2024-01-30 NOTE — PROGRESS NOTES
Physical Therapy Treatment Note  [address] [place] [address]    Patient: Mercedes Amezquita                                                 Visit Date: 2024  :     1975    Referring practitioner:    Shay Espinal MD  Date of Initial Visit:          Type: THERAPY  Noted: 2023    Patient seen for 6 sessions    Visit Diagnoses:    ICD-10-CM ICD-9-CM   1. Whiplash injury to neck, sequela  S13.4XXS 905.7   2. Thoracic spine pain  M54.6 724.1   3. Acute bilateral low back pain with bilateral sciatica  M54.42 724.2    M54.41 724.3     SUBJECTIVE     Subjective: She reports she has done some of the stretches, feels tighter on the L side. No new c/c     PAIN: 5/10     OBJECTIVE     Objective     Therapeutic Exercises    28956 Units Comments   B heel slides over 55 cm SB  2 x 10     B LTR with 55 cm SB  2 x 10     B alternating horizontal abd with YTB  2 x 10 ea Frequent cues for compensations   Scapular squeezes, seated  2 x 10 5 sec hold, added to HEP   Bolstered HEP and reviewed with pt   See education table         Timed Minutes 25     Manual Therapy     [id number]  Comments   STM with massage cream, massage chair B UT, cervical paraspinals   IASTM with BL flexbar, massage chair TL paraspinals, gluteals; MHP to upper thoracic/UT       Timed Minutes 20       Therapy Education/Self Care [id number]   Education offered today See below   [employer] Code [email or web address]   Ongoing HEP   Date: 2024  Prepared by: Jenny Alejandre    Exercises  - Sternocleidomastoid Stretch  - 1 x daily - 7 x weekly - 4 reps - 15-30 sec hold  - Seated Cervical Sidebending Stretch  - 1 x daily - 7 x weekly - 4 reps - 15-30 sec hold  - Seated Scapular Retraction  - 1-2 x daily - 7 x weekly - 2 sets - 10 reps  - Supine Piriformis Stretch with Foot on Ground  - 1 x daily - 7 x weekly - 3 reps - 30 hold  - Hooklying Single Knee to Chest Stretch  - 1 x daily - 7 x weekly - 3 reps  - 30 hold  - Supine Lower Trunk Rotation  - 1 x daily - 7 x weekly - 2 sets - 10 reps - 3 hold   Timed Minutes      Total Timed Treatment:     45   mins  Total Time of Visit:             45   mins         ASSESSMENT/PLAN     GOALS  Goals                                    Progress Note due by 2/22/24                                                    Recert due by 3/19/24   STG by: 4 weeks Comments Date Status   Improve mobility through thoracic and CT junction Deferred today d/t time constraints 1/23 ongoing   Decreased muscle guarding  throughout neck and shoulder girdle Very TTP throughout, guarded and inflammation noted  1/23 ongoing    Decreased muscle guarding  throughout gluteals and piriformis Deferred d/t time constraints today. Difficulty isolating piriformis with stretches 1/23 ongoing   LTG by: 8 weeks         Reports no radicular symptoms for a week unchanged 1/23 ongoing   Improve cervical rotation preeti to 60 deg with no pain L 48 deg R 38 deg  1/23 ongoing   Able to return from fwd lumbar flexion to neutral without gowers maneuver Reports feeling pressure in lumbar, though able to return to neutral without compensation  1/23 MET   Understands improved ergonomics for work and HEP for flexibility and posture Bolstered today  1/30 ongoing    Improve NDI score to 10 or less  27 on 1/23/2024 1/23  ongoing    Improve RENETTA score to 10 or less  29/50 on 1/23/2024 1/23 ongoing     Assessment/Plan     ASSESSMENT: Progressed with gentle exercises today. She tolerated well, though did require frequent cues for compensations.     PLAN: Assess response to tx today and new HEP components, progress accordingly.     SIGNATURE: Jenny Alejandre PTA, [place] License #: [id number]  Electronically Signed on 1/30/2024        [facility] [place] [address]  [phone]

## 2024-02-06 ENCOUNTER — TREATMENT (OUTPATIENT)
Dept: PHYSICAL THERAPY | Facility: CLINIC | Age: 49
End: 2024-02-06
Payer: MEDICARE

## 2024-02-06 DIAGNOSIS — M54.6 THORACIC SPINE PAIN: ICD-10-CM

## 2024-02-06 DIAGNOSIS — M54.40 ACUTE BILATERAL LOW BACK PAIN WITH SCIATICA, SCIATICA LATERALITY UNSPECIFIED: ICD-10-CM

## 2024-02-06 DIAGNOSIS — S13.4XXS WHIPLASH INJURY TO NECK, SEQUELA: Primary | ICD-10-CM

## 2024-02-06 NOTE — PROGRESS NOTES
"                                                                Physical Therapy Treatment Note  [address] [place] [address]    Patient: Mercedes Amezquita                                                 Visit Date: 2024  :     1975    Referring practitioner:    Shay Espinal MD  Date of Initial Visit:          Type: THERAPY  Noted: 2023    Patient seen for 7 sessions    Visit Diagnoses:    ICD-10-CM ICD-9-CM   1. Whiplash injury to neck, sequela  S13.4XXS 905.7   2. Thoracic spine pain  M54.6 724.1   3. Acute bilateral low back pain with sciatica, sciatica laterality unspecified  M54.40 724.2     724.3     SUBJECTIVE     Subjective: She reports that she isn't sure if it's the change in the weather that's causing her symptoms. She reports that her pain bothers her more at night, especially her neck. Reports, \"When I flare up, it takes a minute for it to calm down. That's what I'm still waitin' on, for it to calm down.\" Reports that pain intensity was higher last time, but the pain duration/frequency is higher this time. It feels like she's been doing vigorous exercises.      PAIN: 5/10     OBJECTIVE     Objective     Therapeutic Exercises    16438 Units Comments   Diaphragmatic breathing, reclined  2 min  Added to HEP   LTR over 45 cm SB  2 x 10  Added to HEP   Bolstered and reviewed HEP   See education table   Seated B UT stretch     Reviewed and educated on posture  Encouraged scap depression/retraction        Timed Minutes 25     Modalities Comments   TENS (IFC), massage chair maxA 12, MHP to cervical, B UT       Minutes 15     Manual Therapy     [id number]  Comments   STM with massage cream, massage chair Lumbar, superior gluteals   IASTM with double lacrosse ball, massage chair Lower lumbar superior gluteals       Timed Minutes 15       Therapy Education/Self Care 11336   Education offered today See below   [employer] Code [email or web address]   Ongoing HEP   Date: 2024  Prepared by: Jenny " Hill    Exercises  - Prone Chest Stretch on Chair  - 2 x daily - 7 x weekly - 2 sets - 30 hold  - Mini Lunge  - 2 x daily - 7 x weekly - 2 sets - 10 reps  - Supine Posterior Pelvic Tilt  - 1 x daily - 7 x weekly - 3 sets - 10 reps - 3 sec hold  - Supine Piriformis Stretch with Foot on Ground  - 1-2 x daily - 7 x weekly - 3 sets - 30 sec ea hold  - Supine Diaphragmatic Breathing  - 1-2 x daily - 7 x weekly - 2 sets - 10 reps  - Supine Lower Trunk Rotation  - 1-2 x daily - 7 x weekly - 2 sets - 10 reps   Timed Minutes      Total Timed Treatment:     40   mins  Total Time of Visit:             60   mins         ASSESSMENT/PLAN     GOALS  Goals                                    Progress Note due by 2/22/24                                                    Recert due by 3/19/24   STG by: 4 weeks Comments Date Status   Improve mobility through thoracic and CT junction Deferred today d/t time constraints 1/23 ongoing   Decreased muscle guarding  throughout neck and shoulder girdle Very TTP throughout, guarded and inflammation noted  1/23 ongoing    Decreased muscle guarding  throughout gluteals and piriformis Deferred d/t time constraints today. Difficulty isolating piriformis with stretches 1/23 ongoing   LTG by: 8 weeks         Reports no radicular symptoms for a week unchanged 1/23 ongoing   Improve cervical rotation preeti to 60 deg with no pain L 48 deg R 38 deg  1/23 ongoing   Able to return from fwd lumbar flexion to neutral without gowers maneuver Reports feeling pressure in lumbar, though able to return to neutral without compensation  1/23 MET   Understands improved ergonomics for work and HEP for flexibility and posture Bolstered today  1/30 ongoing    Improve NDI score to 10 or less  27 on 1/23/2024 1/23  ongoing    Improve RENETTA score to 10 or less  29/50 on 1/23/2024 1/23 ongoing     Assessment/Plan     ASSESSMENT: Patient reports prolonged flared pain, therefore interventions kept light and prioritized  "education regarding diaphragmatic breathing, mm relaxation, and reinforced desensitization. She continued to c/o \"pulling\" sensation in R UT, which upon further investigation appears to be mm tension at this time. Reviewed gentle stretches for this and provided education for slowly increasing activity level to desensitize nervous system response and progress towards strengthening goals.     PLAN: Consider gentle UT, LS, SCM stretches next session as well as open book and thoracic rotation exercises. Continues to utilize manual therapies and modalities for pain and mm guarding as needed.     SIGNATURE: Jenny Alejandre PTA, [place] License #: [id number]  Electronically Signed on 2/6/2024        [address] [place] [address] [place]  [place] [address]  [phone]    "

## 2024-02-13 ENCOUNTER — TREATMENT (OUTPATIENT)
Dept: PHYSICAL THERAPY | Facility: CLINIC | Age: 49
End: 2024-02-13
Payer: MEDICARE

## 2024-02-13 DIAGNOSIS — S13.4XXS WHIPLASH INJURY TO NECK, SEQUELA: Primary | ICD-10-CM

## 2024-02-13 DIAGNOSIS — M54.41 ACUTE BILATERAL LOW BACK PAIN WITH BILATERAL SCIATICA: ICD-10-CM

## 2024-02-13 DIAGNOSIS — M54.6 THORACIC SPINE PAIN: ICD-10-CM

## 2024-02-13 DIAGNOSIS — M54.42 ACUTE BILATERAL LOW BACK PAIN WITH BILATERAL SCIATICA: ICD-10-CM

## 2024-02-20 ENCOUNTER — TREATMENT (OUTPATIENT)
Dept: PHYSICAL THERAPY | Facility: CLINIC | Age: 49
End: 2024-02-20
Payer: MEDICARE

## 2024-02-20 DIAGNOSIS — M54.40 ACUTE BILATERAL LOW BACK PAIN WITH SCIATICA, SCIATICA LATERALITY UNSPECIFIED: ICD-10-CM

## 2024-02-20 DIAGNOSIS — M54.41 ACUTE BILATERAL LOW BACK PAIN WITH BILATERAL SCIATICA: ICD-10-CM

## 2024-02-20 DIAGNOSIS — M54.6 THORACIC SPINE PAIN: ICD-10-CM

## 2024-02-20 DIAGNOSIS — M54.42 ACUTE BILATERAL LOW BACK PAIN WITH BILATERAL SCIATICA: ICD-10-CM

## 2024-02-20 DIAGNOSIS — S13.4XXS WHIPLASH INJURY TO NECK, SEQUELA: Primary | ICD-10-CM

## 2024-02-20 NOTE — PROGRESS NOTES
"                                                                Physical Therapy Treatment Note and 30 Day Progress Note  [address] [place] [address]    Patient: Mercedes Amezquita                                                 Visit Date: 2024  :     1975    Referring practitioner:    Shay Espinal MD  Date of Initial Visit:          Type: THERAPY  Noted: 2023    Patient seen for 9 sessions    Visit Diagnoses:    ICD-10-CM ICD-9-CM   1. Whiplash injury to neck, sequela  S13.4XXS 905.7   2. Thoracic spine pain  M54.6 724.1   3. Acute bilateral low back pain with bilateral sciatica  M54.42 724.2    M54.41 724.3   4. Acute bilateral low back pain with sciatica, sciatica laterality unspecified  M54.40 724.2     724.3     SUBJECTIVE     Subjective: She reports feeling everything is better. She reports pain has calmed down and though her legs \"normally feel like noodles\", they do not today. Reports she feels about 30% improved better because she will still get flare-ups but not as often. She reports pain duration is improving. She reports that the middle part of her back still hurts, between her shoulder blades. She reports she will need minimal assistance for ADLs, especially during flare-ups. She reports at first, she couldn't hold her infant niece (<10 lbs) but now she can hold her while standing and take a few steps - she can tell the exercises are helping.     PAIN: 5/10 (neck) 4/10 (back) > reports feeling a lot better    PT G-Codes  Outcome Measure Options: Modified Oswestry  Modified Oswestry Score/Comments: 25/50  Neck Disability Index Score: 23  OBJECTIVE     Objective     Therapeutic Activities    52901 Comments   Addressed all goals for progress note See goals table for details   Obtained updated NDI and MATTHIAS See goals table        Timed Minutes 15     Modalities Comments   TENS (IFC), massage chair maxA 14, B UT       Minutes 10     Manual Therapy     22293  Comments   STM with " massage cream, massage chair Lumbar, superior gluteals       Timed Minutes 20       Therapy Education/Self Care [id number]   Education offered today HEP, POC, symptoms and progress with PT   [employer] Code [email or web address]   Ongoing HEP   Date: 02/20/2024  Prepared by: Jenny Alejandre    Exercises  - Sternocleidomastoid Stretch  - 1 x daily - 7 x weekly - 4 reps - 15-30 sec hold  - Seated Cervical Sidebending Stretch  - 1 x daily - 7 x weekly - 4 reps - 15-30 sec hold  - Seated Scapular Retraction  - 1-2 x daily - 7 x weekly - 2 sets - 10 reps  - Supine Piriformis Stretch with Foot on Ground  - 1 x daily - 7 x weekly - 3 reps - 30 hold  - Hooklying Single Knee to Chest Stretch  - 1 x daily - 7 x weekly - 3 reps - 30 hold  - Supine Lower Trunk Rotation  - 1 x daily - 7 x weekly - 2 sets - 10 reps - 3 hold  - Hooklying Heel Slide  - 1-2 x daily - 7 x weekly - 2 sets - 10 reps  - Abdominal Press into Ball  - 1-2 x daily - 7 x weekly - 2 sets - 10 reps   Timed Minutes 10     Total Timed Treatment:     45   mins  Total Time of Visit:             60   mins         ASSESSMENT/PLAN     GOALS  Goals                                    Progress Note due by 3/21/24                                                    Recert due by 3/19/24   STG by: 4 weeks Comments Date Status   Improve mobility through thoracic and CT junction Mod hypomobility 2/20 ongoing   Decreased muscle guarding  throughout neck and shoulder girdle Mod guarding, reports flared pain in neck 2/20 ongoing    Decreased muscle guarding  throughout gluteals and piriformis Not painful or significantly guarded today  2/13 MET   LTG by: 8 weeks         Reports no radicular symptoms for a week None today or within the last week  2/20 MET   Improve cervical rotation preeti to 60 deg with no pain L 48 deg R 38 deg on 1/23/24  L 52 deg R 49 deg on 2/20/24 2/20 improving   Able to return from fwd lumbar flexion to neutral without gowers maneuver Reports feeling pressure in lumbar, though  able to return to neutral without compensation  1/23 MET   Understands improved ergonomics for work and HEP for flexibility and posture She reports compliance 2x/week 2/20 ongoing    Improve NDI score to 10 or less  27 on 1/23/2024   23 on 2/20/2024 2/20 ongoing    Improve RENETTA score to 10 or less  29/50 on 1/23/2024   25/50 on 2/20/2024 2/20 ongoing     Assessment/Plan     ASSESSMENT: Patient has met three of her nine goals thus far and is progressing towards her remaining six. She reports feeling about 30% improved since eval, most notably with pain duration. Just within the last 2-3 sessions, pt has been able to tolerate gentle exercise and reports she has noticed improvements with her strength. She reports requiring min assist with her ADLs, especially during a flare-up and would benefit from continued skilled PT to progress her strength and endurance as well as pain modulation to improve her QoL and independence with ADLs.     PLAN: Consider gentle UT, LS, SCM stretches next session. Continue conservative progression of hip, core, and postural strengthening, addressing mm guarding in cervicothoracic region as needed. Bolster HEP as appropriate.     SIGNATURE: Jenny Alejandre PTA, [place] License #: [id number]  Electronically Signed on 2/20/2024        [address] [place] [address]  [phone]

## 2024-02-20 NOTE — Clinical Note
PROGRESS NOTE -- contin 1-2x/week -- just now starting to make good progress towards strengthening. Been able to tolerate exercises last few sessions, avery when we end with TENS

## 2024-02-21 NOTE — PROGRESS NOTES
Progress Note Addendum      Patient: Mercedes Amezquita           : 1975  Visit Date: 2024  Referring practitioner: Shay Espinal MD  Date of Initial Visit: Type: THERAPY  Noted: 2023  Patient seen for 9 sessions  Visit Diagnoses:    ICD-10-CM ICD-9-CM   1. Whiplash injury to neck, sequela  S13.4XXS 905.7   2. Thoracic spine pain  M54.6 724.1   3. Acute bilateral low back pain with bilateral sciatica  M54.42 724.2    M54.41 724.3   4. Acute bilateral low back pain with sciatica, sciatica laterality unspecified  M54.40 724.2     724.3       PT G-Codes  Outcome Measure Options: Modified Oswestry  Modified Oswestry Score/Comments: 25/50  Neck Disability Index Score: 23  Clinical Progress: improved  Home Program Compliance: Yes  Progress toward previous goals: Partially Met  Prognosis to achieve goals: good    Objective   See PTA note for goals     Assessment & Plan       Assessment  Impairments: abnormal coordination, abnormal muscle tone, abnormal or restricted ROM, activity intolerance, impaired physical strength, lacks appropriate home exercise program and pain with function   Functional limitations: carrying objects, lifting, sleeping, walking, pulling, uncomfortable because of pain, moving in bed, standing, stooping, reaching overhead and unable to perform repetitive tasks   Assessment details:       Plan  Therapy options: will be seen for skilled therapy services  Planned modality interventions: cryotherapy, dry needling, electrical stimulation/Russian stimulation, TENS, low level laser therapy, iontophoresis, ultrasound and thermotherapy (hydrocollator packs)  Planned therapy interventions: abdominal trunk stabilization, ADL retraining, body mechanics training, fine motor coordination training, flexibility, functional ROM exercises, home exercise program, joint mobilization, manual therapy, motor coordination training, neuromuscular re-education, postural training, soft tissue mobilization,  spinal/joint mobilization, strengthening, stretching and therapeutic activities  Frequency: 2x week  Duration in weeks: 8        I reviewed the treatment and goals with Jenny Alejandre PTA and agree with the POC.    SIGNATURE: Pauline Smith PT DPT, License #: [id number]    Electronically Signed on 2/21/2024

## 2024-02-27 ENCOUNTER — TREATMENT (OUTPATIENT)
Dept: PHYSICAL THERAPY | Facility: CLINIC | Age: 49
End: 2024-02-27
Payer: MEDICARE

## 2024-02-27 DIAGNOSIS — M54.40 ACUTE BILATERAL LOW BACK PAIN WITH SCIATICA, SCIATICA LATERALITY UNSPECIFIED: ICD-10-CM

## 2024-02-27 DIAGNOSIS — M54.42 ACUTE BILATERAL LOW BACK PAIN WITH BILATERAL SCIATICA: ICD-10-CM

## 2024-02-27 DIAGNOSIS — M54.41 ACUTE BILATERAL LOW BACK PAIN WITH BILATERAL SCIATICA: ICD-10-CM

## 2024-02-27 DIAGNOSIS — M54.6 THORACIC SPINE PAIN: ICD-10-CM

## 2024-02-27 DIAGNOSIS — S13.4XXS WHIPLASH INJURY TO NECK, SEQUELA: Primary | ICD-10-CM
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
## 2024-03-05 ENCOUNTER — TREATMENT (OUTPATIENT)
Dept: PHYSICAL THERAPY | Facility: CLINIC | Age: 49
End: 2024-03-05
Payer: MEDICARE

## 2024-03-05 DIAGNOSIS — M54.6 THORACIC SPINE PAIN: ICD-10-CM

## 2024-03-05 DIAGNOSIS — M54.42 ACUTE BILATERAL LOW BACK PAIN WITH BILATERAL SCIATICA: ICD-10-CM

## 2024-03-05 DIAGNOSIS — M54.41 ACUTE BILATERAL LOW BACK PAIN WITH BILATERAL SCIATICA: ICD-10-CM

## 2024-03-05 DIAGNOSIS — S13.4XXS WHIPLASH INJURY TO NECK, SEQUELA: Primary | ICD-10-CM

## 2024-03-05 DIAGNOSIS — F07.81 POST CONCUSSIVE SYNDROME: ICD-10-CM
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
## 2024-03-11 ENCOUNTER — TELEPHONE (OUTPATIENT)
Dept: INTERNAL MEDICINE | Facility: CLINIC | Age: 49
End: 2024-03-11
Payer: MEDICARE

## 2024-03-11 PROCEDURE — 87070 CULTURE OTHR SPECIMN AEROBIC: CPT | Performed by: FAMILY MEDICINE

## 2024-03-11 PROCEDURE — 87205 SMEAR GRAM STAIN: CPT | Performed by: FAMILY MEDICINE

## 2024-03-12 ENCOUNTER — TREATMENT (OUTPATIENT)
Dept: PHYSICAL THERAPY | Facility: CLINIC | Age: 49
End: 2024-03-12
Payer: MEDICARE

## 2024-03-12 DIAGNOSIS — M54.41 ACUTE BILATERAL LOW BACK PAIN WITH BILATERAL SCIATICA: ICD-10-CM

## 2024-03-12 DIAGNOSIS — M54.6 THORACIC SPINE PAIN: ICD-10-CM

## 2024-03-12 DIAGNOSIS — F07.81 POST CONCUSSIVE SYNDROME: ICD-10-CM

## 2024-03-12 DIAGNOSIS — S13.4XXS WHIPLASH INJURY TO NECK, SEQUELA: Primary | ICD-10-CM

## 2024-03-12 DIAGNOSIS — M54.42 ACUTE BILATERAL LOW BACK PAIN WITH BILATERAL SCIATICA: ICD-10-CM
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
## 2024-03-19 ENCOUNTER — TREATMENT (OUTPATIENT)
Dept: PHYSICAL THERAPY | Facility: CLINIC | Age: 49
End: 2024-03-19
Payer: MEDICARE

## 2024-03-19 DIAGNOSIS — F07.81 POST CONCUSSIVE SYNDROME: ICD-10-CM

## 2024-03-19 DIAGNOSIS — S13.4XXS WHIPLASH INJURY TO NECK, SEQUELA: Primary | ICD-10-CM

## 2024-03-19 DIAGNOSIS — M54.41 ACUTE BILATERAL LOW BACK PAIN WITH BILATERAL SCIATICA: ICD-10-CM

## 2024-03-19 DIAGNOSIS — M54.6 THORACIC SPINE PAIN: ICD-10-CM

## 2024-03-19 DIAGNOSIS — M54.42 ACUTE BILATERAL LOW BACK PAIN WITH BILATERAL SCIATICA: ICD-10-CM
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
## 2024-03-26 ENCOUNTER — TREATMENT (OUTPATIENT)
Dept: PHYSICAL THERAPY | Facility: CLINIC | Age: 49
End: 2024-03-26
Payer: MEDICARE

## 2024-03-26 DIAGNOSIS — M54.42 ACUTE BILATERAL LOW BACK PAIN WITH BILATERAL SCIATICA: ICD-10-CM

## 2024-03-26 DIAGNOSIS — F07.81 POST CONCUSSIVE SYNDROME: ICD-10-CM

## 2024-03-26 DIAGNOSIS — M54.6 THORACIC SPINE PAIN: ICD-10-CM

## 2024-03-26 DIAGNOSIS — S13.4XXS WHIPLASH INJURY TO NECK, SEQUELA: Primary | ICD-10-CM

## 2024-03-26 DIAGNOSIS — M54.41 ACUTE BILATERAL LOW BACK PAIN WITH BILATERAL SCIATICA: ICD-10-CM

## 2024-03-26 DIAGNOSIS — M54.40 ACUTE BILATERAL LOW BACK PAIN WITH SCIATICA, SCIATICA LATERALITY UNSPECIFIED: ICD-10-CM

## 2024-03-26 NOTE — PROGRESS NOTES
Physical Therapy Treatment Note  [address], [place] [address]    Patient: Mercedes Amezquita                                                 Visit Date: 3/26/2024  :     1975    Referring practitioner:    Shay Espinal MD  Date of Initial Visit:          Type: THERAPY  Noted: 2023    Patient seen for 14 sessions    Visit Diagnoses:    ICD-10-CM ICD-9-CM   1. Whiplash injury to neck, sequela  S13.4XXS 905.7   2. Thoracic spine pain  M54.6 724.1   3. Acute bilateral low back pain with bilateral sciatica  M54.42 724.2    M54.41 724.3   4. Post concussive syndrome  F07.81 310.2   5. Acute bilateral low back pain with sciatica, sciatica laterality unspecified  M54.40 724.2     724.3         SUBJECTIVE     Subjective: Pt reports that she has been doing good since last time. She reports tremendous relief in her neck pain, and has been able to sleep better because of it. She reports that the dizziness has been better.       PAIN: 3/10 L shoulder/low neck, R low back) >     OBJECTIVE     Objective   Therapeutic Exercises    05707 Units Comments   Ball on wall cervical 3 way  10x5' ea    HL horizontal abduction  2x10  GTB   TRX incline face pulls  2x10                Timed Minutes 15       Manual Therapy     94883  Comments   Prone PA mobs to CTJ, upper thoracic     Cervical manual distraction, Suboccipital release     STM to cervical paraspinals, TPR to R levator     Rotational glides     Percussive IASTM using PowerBoost lvl 1 using ball attachment to R UT region          Timed Minutes 28         Therapy Education/Self Care 88757   Education offered today    [facility] [id number]; [id number] (post-concussive HEP)   Ongoing HEP   Date: 2024  Prepared by: Jenny Alejandre    Exercises  - Sternocleidomastoid Stretch  - 1 x daily - 7 x weekly - 4 reps - 15-30 sec hold  - Seated Cervical Sidebending Stretch  - 1 x daily - 7 x weekly - 4 reps  - 15-30 sec hold  - Seated Scapular Retraction  - 1-2 x daily - 7 x weekly - 2 sets - 10 reps  - Supine Piriformis Stretch with Foot on Ground  - 1 x daily - 7 x weekly - 3 reps - 30 hold  - Hooklying Single Knee to Chest Stretch  - 1 x daily - 7 x weekly - 3 reps - 30 hold  - Supine Lower Trunk Rotation  - 1 x daily - 7 x weekly - 2 sets - 10 reps - 3 hold  - Hooklying Heel Slide  - 1-2 x daily - 7 x weekly - 2 sets - 10 reps  - Abdominal Press into Ball  - 1-2 x daily - 7 x weekly - 2 sets - 10 reps   Timed Minutes      Total Timed Treatment:     43   mins  Total Time of Visit:             43   mins         ASSESSMENT/PLAN     GOALS  Goals                                    Progress Note due by 4/18/24                                                    Recert due by 6/16/24   STG by: 4 weeks Comments Date Status   Improve mobility through thoracic and CT junction Mod hypomobility 3/19 ongoing   Decreased muscle guarding  throughout neck and shoulder girdle Gradually decreasing 3/19 ongoing    Decreased muscle guarding  throughout gluteals and piriformis Not painful or significantly guarded today  2/13 MET   LTG by: 8 weeks         Reports no radicular symptoms for a week None today or within the last week  2/20 MET   Improve cervical rotation preeti to 60 deg with no pain L 48 deg R 38 deg on 1/23/24  L 52 deg R 49 deg on 2/20/24  L 65 deg; R 60 deg on 3/19/24 after manual 3/19 MET   Able to return from fwd lumbar flexion to neutral without gowers maneuver Reports feeling pressure in lumbar, though able to return to neutral without compensation  1/23 MET   Understands improved ergonomics for work and HEP for flexibility and posture She reports compliance 2x/week 3/19 ongoing    Improve NDI score to 10 or less  27 on 1/23/2024   23 on 2/20/2024   27 on 3/19 3/19 ongoing    Improve RENETTA score to 10 or less  29/50 on 1/23/2024   25/50 on 2/20/2024  4/50 on 3/19 3/19 MET     Assessment/Plan     ASSESSMENT: Progressed  cervical strengthening and postural stabilization today, and concluded session  with manual techniques for pain relief and mobility.  She reported relief following treatment this date.       PLAN: Continue with post-concussive rehab, addressing mm guarding in cervicothoracic region as needed.      SIGNATURE: Pauline Smith PT DPT, [place] License #: [id number]    Electronically Signed on 3/26/2024            [address] [place] [address] [place] [address]  [phone]

## 2024-04-02 ENCOUNTER — TREATMENT (OUTPATIENT)
Dept: PHYSICAL THERAPY | Facility: CLINIC | Age: 49
End: 2024-04-02
Payer: MEDICARE

## 2024-04-02 DIAGNOSIS — M54.41 ACUTE BILATERAL LOW BACK PAIN WITH BILATERAL SCIATICA: ICD-10-CM

## 2024-04-02 DIAGNOSIS — S13.4XXS WHIPLASH INJURY TO NECK, SEQUELA: Primary | ICD-10-CM

## 2024-04-02 DIAGNOSIS — M54.6 THORACIC SPINE PAIN: ICD-10-CM

## 2024-04-02 DIAGNOSIS — M54.42 ACUTE BILATERAL LOW BACK PAIN WITH BILATERAL SCIATICA: ICD-10-CM

## 2024-04-02 DIAGNOSIS — F07.81 POST CONCUSSIVE SYNDROME: ICD-10-CM

## 2024-04-02 DIAGNOSIS — M54.40 ACUTE BILATERAL LOW BACK PAIN WITH SCIATICA, SCIATICA LATERALITY UNSPECIFIED: ICD-10-CM

## 2024-04-02 NOTE — PROGRESS NOTES
"                                                                Physical Therapy Treatment Note  [address] [place] [address]    Patient: Mercedes Amezquita                                                 Visit Date: 2024  :     1975    Referring practitioner:    Shay Espinal MD  Date of Initial Visit:          Type: THERAPY  Noted: 2023    Patient seen for 15 sessions    Visit Diagnoses:    ICD-10-CM ICD-9-CM   1. Whiplash injury to neck, sequela  S13.4XXS 905.7   2. Thoracic spine pain  M54.6 724.1   3. Acute bilateral low back pain with bilateral sciatica  M54.42 724.2    M54.41 724.3   4. Post concussive syndrome  F07.81 310.2   5. Acute bilateral low back pain with sciatica, sciatica laterality unspecified  M54.40 724.2     724.3     SUBJECTIVE     Subjective: reports that her feet have been swelling and had some chest discomfort this weekend. Denies SOA when lying down and reports she has undiagnosed sleep apnea.       PAIN: 5/10 (neck, back, knees) > reports she no longer has even knee pain, \"I haven't felt like this in forever! Right now every thing is calmed down.\"      OBJECTIVE     Objective   Neuromuscular Reeducation     24022 Comments   Walking in hallway with lateral and vertical ball tosses with black/white ball  x50 ft   Treadmill ball rolls with visual tracking at 6.7 mph and 3% incline x10   Quadruped visual tracking with black/white ball in all planes and directions, varying speeds 2 x 30 sec -- reports feeling she has improved with this.    Quadruped head nods/turns x10    Attempted BITS machine but not working today Deferred    Pushing COW in light hallway with VOR optokinetics visual tracking  x100 ft -- reports feeling like she was going to trip over her feet, would almost run into wall on the R.    Pushing COW fwd/bckwd in dark hallway with corrective saccades/VOR x2 fast videos. For corrective saccades: pt visual fixation on red dot (L/R) as dictated by therapist " x100 ft -- denied increased symptoms       Timed Minutes 30     Manual Therapy     86746  Comments   Percussive IASTM using PowerBoost lvl 2 using Y attachment to R UT region   Prone, MHP to cervical       Timed Minutes 10       Therapy Education/Self Care 91711   Education offered today    [facility] Code [id number]; [id number] (post-concussive HEP)   Ongoing HEP   Date: 02/20/2024  Prepared by: Jenny Alejandre    Exercises  - Sternocleidomastoid Stretch  - 1 x daily - 7 x weekly - 4 reps - 15-30 sec hold  - Seated Cervical Sidebending Stretch  - 1 x daily - 7 x weekly - 4 reps - 15-30 sec hold  - Seated Scapular Retraction  - 1-2 x daily - 7 x weekly - 2 sets - 10 reps  - Supine Piriformis Stretch with Foot on Ground  - 1 x daily - 7 x weekly - 3 reps - 30 hold  - Hooklying Single Knee to Chest Stretch  - 1 x daily - 7 x weekly - 3 reps - 30 hold  - Supine Lower Trunk Rotation  - 1 x daily - 7 x weekly - 2 sets - 10 reps - 3 hold  - Hooklying Heel Slide  - 1-2 x daily - 7 x weekly - 2 sets - 10 reps  - Abdominal Press into Ball  - 1-2 x daily - 7 x weekly - 2 sets - 10 reps   Timed Minutes      Total Timed Treatment:     40   mins  Total Time of Visit:             45   mins         ASSESSMENT/PLAN     GOALS  Goals                                    Progress Note due by 4/18/24                                                    Recert due by 6/16/24   STG by: 4 weeks Comments Date Status   Improve mobility through thoracic and CT junction Mod hypomobility 3/19 ongoing   Decreased muscle guarding  throughout neck and shoulder girdle Gradually decreasing 3/19 ongoing    Decreased muscle guarding  throughout gluteals and piriformis Not painful or significantly guarded today  2/13 MET   LTG by: 8 weeks         Reports no radicular symptoms for a week None today or within the last week  2/20 MET   Improve cervical rotation preeti to 60 deg with no pain L 48 deg R 38 deg on 1/23/24  L 52 deg R 49 deg on 2/20/24  L 65 deg; R 60 deg  on 3/19/24 after manual 3/19 MET   Able to return from fwd lumbar flexion to neutral without gowers maneuver Reports feeling pressure in lumbar, though able to return to neutral without compensation  1/23 MET   Understands improved ergonomics for work and HEP for flexibility and posture She reports compliance 2x/week 3/19 ongoing    Improve NDI score to 10 or less  27 on 1/23/2024   23 on 2/20/2024   27 on 3/19 3/19 ongoing    Improve RENETTA score to 10 or less  29/50 on 1/23/2024   25/50 on 2/20/2024  4/50 on 3/19 3/19 MET     Assessment/Plan     ASSESSMENT: Patient reports slight increase in HA after neuro exercises today, though denied increase in dizziness/nausea. She reports feeling this is improved. Following manual therapies, pt reported all pain in her body had been absolved.       PLAN: Continue with post-concussive rehab, addressing mm guarding in cervicothoracic region as needed. May consider BITS machine for rotational components. CONSIDER INCORPORATING PLIABLE SURFACES NEXT SESSION AS THIS FLARED SYMPTOMS LAST TIME.     SIGNATURE: Jenny Alejandre PTA, [place] License #: [id number]  Electronically Signed on 4/2/2024            [address] [place] [address] [place] [address]  [phone]

## 2024-04-09 ENCOUNTER — TREATMENT (OUTPATIENT)
Dept: PHYSICAL THERAPY | Facility: CLINIC | Age: 49
End: 2024-04-09
Payer: MEDICARE

## 2024-04-09 DIAGNOSIS — S13.4XXS WHIPLASH INJURY TO NECK, SEQUELA: Primary | ICD-10-CM

## 2024-04-09 DIAGNOSIS — F07.81 POST CONCUSSIVE SYNDROME: ICD-10-CM

## 2024-04-09 DIAGNOSIS — M54.6 THORACIC SPINE PAIN: ICD-10-CM

## 2024-04-09 DIAGNOSIS — M54.41 ACUTE BILATERAL LOW BACK PAIN WITH BILATERAL SCIATICA: ICD-10-CM

## 2024-04-09 DIAGNOSIS — M54.42 ACUTE BILATERAL LOW BACK PAIN WITH BILATERAL SCIATICA: ICD-10-CM

## 2024-04-09 DIAGNOSIS — M54.40 ACUTE BILATERAL LOW BACK PAIN WITH SCIATICA, SCIATICA LATERALITY UNSPECIFIED: ICD-10-CM

## 2024-04-09 NOTE — PROGRESS NOTES
"                                                                Physical Therapy Treatment Note  [address] [place] [address]    Patient: Mercedes Amezquita                                                 Visit Date: 2024  :     1975    Referring practitioner:    Shay Espinal MD  Date of Initial Visit:          Type: THERAPY  Noted: 2023    Patient seen for 16 sessions    Visit Diagnoses:    ICD-10-CM ICD-9-CM   1. Whiplash injury to neck, sequela  S13.4XXS 905.7   2. Thoracic spine pain  M54.6 724.1   3. Acute bilateral low back pain with bilateral sciatica  M54.42 724.2    M54.41 724.3   4. Post concussive syndrome  F07.81 310.2   5. Acute bilateral low back pain with sciatica, sciatica laterality unspecified  M54.40 724.2     724.3       SUBJECTIVE     Subjective: Everything felt calm after last time. She felt more unsteady but not dizzy.       PAIN: 10 (neck) > \"everything is calmed down\"     OBJECTIVE     Objective   Neuromuscular Reeducation     10488 Comments   Eye chart reading while stepping forward/backward sideways    letter/number sequence on mirror standing FT, SR on airex reaching across midline multiple planes/directions    2 target VOR multidirectional letter/number sequence on mirror standing FT, SR on airex    Pushing COW in light and dark hallway with VOR optokinetics visual tracking  x100 ft *4 - denied increased symptoms   Timed Minutes 30     Manual Therapy     85033  Comments   Percussive IASTM using PowerBoost lvl 2 using Y attachment to R UT region   Prone, MHP to cervical   Prone thoracic rot mobs grade 2    Timed Minutes 13       Therapy Education/Self Care 10216   Education offered today    [facility] Code [id number]; [id number] (post-concussive HEP)   Ongoing HEP   Date: 2024  Prepared by: Jenny Alejandre    Exercises  - Sternocleidomastoid Stretch  - 1 x daily - 7 x weekly - 4 reps - 15-30 sec hold  - Seated Cervical Sidebending Stretch  - 1 x daily - 7 x " weekly - 4 reps - 15-30 sec hold  - Seated Scapular Retraction  - 1-2 x daily - 7 x weekly - 2 sets - 10 reps  - Supine Piriformis Stretch with Foot on Ground  - 1 x daily - 7 x weekly - 3 reps - 30 hold  - Hooklying Single Knee to Chest Stretch  - 1 x daily - 7 x weekly - 3 reps - 30 hold  - Supine Lower Trunk Rotation  - 1 x daily - 7 x weekly - 2 sets - 10 reps - 3 hold  - Hooklying Heel Slide  - 1-2 x daily - 7 x weekly - 2 sets - 10 reps  - Abdominal Press into Ball  - 1-2 x daily - 7 x weekly - 2 sets - 10 reps   Timed Minutes      Total Timed Treatment:   43     mins  Total Time of Visit:            43   mins         ASSESSMENT/PLAN     GOALS  Goals                                    Progress Note due by 4/18/24                                                    Recert due by 6/16/24   STG by: 4 weeks Comments Date Status   Improve mobility through thoracic and CT junction Mod hypomobility 3/19 ongoing   Decreased muscle guarding  throughout neck and shoulder girdle Gradually decreasing 3/19 ongoing    Decreased muscle guarding  throughout gluteals and piriformis Not painful or significantly guarded today  2/13 MET   LTG by: 8 weeks         Reports no radicular symptoms for a week None today or within the last week  2/20 MET   Improve cervical rotation preeti to 60 deg with no pain L 48 deg R 38 deg on 1/23/24  L 52 deg R 49 deg on 2/20/24  L 65 deg; R 60 deg on 3/19/24 after manual 3/19 MET   Able to return from fwd lumbar flexion to neutral without gowers maneuver Reports feeling pressure in lumbar, though able to return to neutral without compensation  1/23 MET   Understands improved ergonomics for work and HEP for flexibility and posture She reports compliance 2x/week 3/19 ongoing    Improve NDI score to 10 or less  27 on 1/23/2024   23 on 2/20/2024   27 on 3/19 3/19 ongoing    Improve RENETTA score to 10 or less  29/50 on 1/23/2024   25/50 on 2/20/2024  4/50 on 3/19 3/19 MET     Assessment/Plan     ASSESSMENT:  No post-concussive s/s produced today even w/ incorporation of unstable and compliant surfaces during VOR tasks as recommended previously. She continues to experience complete relief w/ manual techniques and moist heat to cervical area even after performance of several tasks involving cervical AROM.     PLAN: Continue with post-concussive rehab, addressing mm guarding in cervicothoracic region as needed. May consider BITS machine for rotational components. CONSIDER INCORPORATING PLIABLE SURFACES NEXT SESSION AS THIS FLARED SYMPTOMS LAST TIME.     SIGNATURE: Porsha Ulrich, PT DPT, [place] License #: [id number]  Electronically Signed on 4/9/2024            [address] [place] [address] [place] [address]  [phone]

## 2024-04-23 ENCOUNTER — TREATMENT (OUTPATIENT)
Dept: PHYSICAL THERAPY | Facility: CLINIC | Age: 49
End: 2024-04-23
Payer: MEDICARE

## 2024-04-23 DIAGNOSIS — M54.42 ACUTE BILATERAL LOW BACK PAIN WITH BILATERAL SCIATICA: ICD-10-CM

## 2024-04-23 DIAGNOSIS — M54.40 ACUTE BILATERAL LOW BACK PAIN WITH SCIATICA, SCIATICA LATERALITY UNSPECIFIED: ICD-10-CM

## 2024-04-23 DIAGNOSIS — S13.4XXS WHIPLASH INJURY TO NECK, SEQUELA: Primary | ICD-10-CM

## 2024-04-23 DIAGNOSIS — M54.6 THORACIC SPINE PAIN: ICD-10-CM

## 2024-04-23 DIAGNOSIS — F07.81 POST CONCUSSIVE SYNDROME: ICD-10-CM

## 2024-04-23 DIAGNOSIS — M54.41 ACUTE BILATERAL LOW BACK PAIN WITH BILATERAL SCIATICA: ICD-10-CM

## 2024-04-23 PROCEDURE — 97140 MANUAL THERAPY 1/> REGIONS: CPT

## 2024-04-23 NOTE — PROGRESS NOTES
"                                                                Physical Therapy Treatment Note and 30 Day Progress Note  [address] [place] [address]    Patient: Mercedes Amezquita                                                 Visit Date: 2024  :     1975    Referring practitioner:    Shay Espinal MD  Date of Initial Visit:          Type: THERAPY  Noted: 2023    Patient seen for 17 sessions    Visit Diagnoses:    ICD-10-CM ICD-9-CM   1. Whiplash injury to neck, sequela  S13.4XXS 905.7   2. Thoracic spine pain  M54.6 724.1   3. Acute bilateral low back pain with bilateral sciatica  M54.42 724.2    M54.41 724.3   4. Post concussive syndrome  F07.81 310.2   5. Acute bilateral low back pain with sciatica, sciatica laterality unspecified  M54.40 724.2     724.3         SUBJECTIVE     Subjective: She went on a drive to [place] and her neck started bothering her. She also started getting a HA in the middle and back of her head. She does report she feels improvement since starting skilled OP PT.      PAIN: 5/10 (neck) > \"everything is calmed down\" > 4/10     OBJECTIVE     Objective     Manual Therapy     88434  Comments   Supine CT UPA grade 2/3    Supine STM scalenes, suboccipitals, LS, UT, CT parapsinals    Timed Minutes 40       Therapy Education/Self Care 56330   Education offered today    [facility] Code [id number]; [id number] (post-concussive HEP)   Ongoing HEP   Date: 2024  Prepared by: Jenny Alejandre    Exercises  - Sternocleidomastoid Stretch  - 1 x daily - 7 x weekly - 4 reps - 15-30 sec hold  - Seated Cervical Sidebending Stretch  - 1 x daily - 7 x weekly - 4 reps - 15-30 sec hold  - Seated Scapular Retraction  - 1-2 x daily - 7 x weekly - 2 sets - 10 reps  - Supine Piriformis Stretch with Foot on Ground  - 1 x daily - 7 x weekly - 3 reps - 30 hold  - Hooklying Single Knee to Chest Stretch  - 1 x daily - 7 x weekly - 3 reps - 30 hold  - Supine Lower Trunk Rotation  - 1 x daily - 7 " x weekly - 2 sets - 10 reps - 3 hold  - Hooklying Heel Slide  - 1-2 x daily - 7 x weekly - 2 sets - 10 reps  - Abdominal Press into Ball  - 1-2 x daily - 7 x weekly - 2 sets - 10 reps   Timed Minutes      Total Timed Treatment:  40  mins  Total Time of Visit:           40   mins         ASSESSMENT/PLAN     GOALS  Goals                                    Progress Note due by 5/23/24                                                    Recert due by 6/16/24   STG by: 4 weeks Comments Date Status   Improve mobility through thoracic and CT junction Mod hypomobility 4/23 ongoing   Decreased muscle guarding  throughout neck and shoulder girdle Gradually decreasing 4/23 ongoing    Decreased muscle guarding  throughout gluteals and piriformis Not painful or significantly guarded today  2/13 MET   LTG by: 8 weeks         Reports no radicular symptoms for a week None today or within the last week  2/20 MET   Improve cervical rotation preeti to 60 deg with no pain L 48 deg R 38 deg on 1/23/24  L 52 deg R 49 deg on 2/20/24  L 65 deg; R 60 deg on 3/19/24 after manual 3/19 MET   Able to return from fwd lumbar flexion to neutral without gowers maneuver Reports feeling pressure in lumbar, though able to return to neutral without compensation  1/23 MET   Understands improved ergonomics for work and HEP for flexibility and posture She reports compliance 2x/week 4/23 ongoing    Improve NDI score to 10 or less  27 on 1/23/2024   23 on 2/20/2024   27 on 3/19   19 on 4/23 4/23 ongoing    Improve RENETTA score to 10 or less  29/50 on 1/23/2024   25/50 on 2/20/2024  4/50 on 3/19 3/19 MET     Assessment & Plan       Assessment  Impairments: abnormal coordination, abnormal muscle tone, abnormal or restricted ROM, activity intolerance, impaired physical strength, lacks appropriate home exercise program and pain with function   Functional limitations: carrying objects, lifting, sleeping, walking, pulling, uncomfortable because of pain, moving in bed,  standing, stooping, reaching overhead and unable to perform repetitive tasks   Assessment details:       Plan  Therapy options: will be seen for skilled therapy services  Planned modality interventions: cryotherapy, dry needling, electrical stimulation/Russian stimulation, TENS, low level laser therapy, iontophoresis, ultrasound and thermotherapy (hydrocollator packs)  Planned therapy interventions: abdominal trunk stabilization, ADL retraining, body mechanics training, fine motor coordination training, flexibility, functional ROM exercises, home exercise program, joint mobilization, manual therapy, motor coordination training, neuromuscular re-education, postural training, soft tissue mobilization, spinal/joint mobilization, strengthening, stretching and therapeutic activities  Frequency: 2x week  Duration in weeks: 8       ASSESSMENT: Progress note performed per POC. Pt reports subjective improvement in neck pain as reflected by improved NDI score. The dizziness has improved and the post-concussive HEP is not as symptom provocative. She still has difficulty w/ prolonged sitting increasing her neck pain which may be attributable to seated postural deficits. She continues to have headaches as well. She would benefit from skilled OP PT services to optimize QOL and functional capacity.    PLAN: Continue with post-concussive rehab, addressing mm guarding in cervicothoracic region as needed. May consider BITS machine for rotational components. CONSIDER INCORPORATING PLIABLE SURFACES NEXT SESSION AS THIS FLARED SYMPTOMS LAST TIME.     SIGNATURE: Porsha Ulrich, YANET KWONG, [place] License #: [id number]  Electronically Signed on 4/23/2024            [place] [address]  [phone]

## 2024-04-30 ENCOUNTER — TREATMENT (OUTPATIENT)
Dept: PHYSICAL THERAPY | Facility: CLINIC | Age: 49
End: 2024-04-30
Payer: MEDICARE

## 2024-04-30 DIAGNOSIS — M54.41 ACUTE BILATERAL LOW BACK PAIN WITH BILATERAL SCIATICA: ICD-10-CM

## 2024-04-30 DIAGNOSIS — M54.6 THORACIC SPINE PAIN: ICD-10-CM

## 2024-04-30 DIAGNOSIS — S13.4XXS WHIPLASH INJURY TO NECK, SEQUELA: Primary | ICD-10-CM

## 2024-04-30 DIAGNOSIS — M54.42 ACUTE BILATERAL LOW BACK PAIN WITH BILATERAL SCIATICA: ICD-10-CM

## 2024-04-30 PROCEDURE — 97140 MANUAL THERAPY 1/> REGIONS: CPT | Performed by: PHYSICAL THERAPIST

## 2024-04-30 PROCEDURE — 97530 THERAPEUTIC ACTIVITIES: CPT | Performed by: PHYSICAL THERAPIST

## 2024-04-30 NOTE — PROGRESS NOTES
"                                                                Physical Therapy Treatment Note  [address] [place] [address]    Patient: Mercedes Amezquita                                                 Visit Date: 2024  :     1975    Referring practitioner:    Shay Espinal MD  Date of Initial Visit:          Type: THERAPY  Noted: 2023    Patient seen for 18 sessions    Visit Diagnoses:    ICD-10-CM ICD-9-CM   1. Whiplash injury to neck, sequela  S13.4XXS 905.7   2. Acute bilateral low back pain with bilateral sciatica  M54.42 724.2    M54.41 724.3   3. Thoracic spine pain  M54.6 724.1         SUBJECTIVE     Subjective: She reports still having symptoms that are worse in the neck one day and in the upper back others. She feels like she is still a bit off balance. She also reports that her R hip is \"flared up\" today as bad as when this all started.       PAIN: 5/10 (neck) > \"everything is calmed down\" > 10     OBJECTIVE     Objective     Manual Therapy     [id number]  Comments   Supine cervical PA mobs grade 3    Supine STM scalenes, suboccipitals, LS, UT, CT parapsinals    Timed Minutes 10       Therapeutic Activities    [id number] Comments   Balance on BOSU- black side up With eye movements, then with distraction of listing animals in ABC order   Stepping up and over BOSU- Blue side up Alternating legs.    Attempted step together onto BOSU blue side up More difficulty keeping balance   [place] down and back in hallway    Placed SI belt R \"hip\" felt much better   Timed Minutes 30      Therapy Education/Self Care 04164   Education offered today Use of SI belt. Try weighted blanket to calm nervous system: 10-12 lb.   [employer] Code [id number]; [id number] (post-concussive HEP)   Ongoing HEP   Date: 2024  Prepared by: Jenny Alejandre    Exercises  - Sternocleidomastoid Stretch  - 1 x daily - 7 x weekly - 4 reps - 15-30 sec hold  - Seated Cervical Sidebending Stretch  - 1 x daily - 7 x weekly - 4 " reps - 15-30 sec hold  - Seated Scapular Retraction  - 1-2 x daily - 7 x weekly - 2 sets - 10 reps  - Supine Piriformis Stretch with Foot on Ground  - 1 x daily - 7 x weekly - 3 reps - 30 hold  - Hooklying Single Knee to Chest Stretch  - 1 x daily - 7 x weekly - 3 reps - 30 hold  - Supine Lower Trunk Rotation  - 1 x daily - 7 x weekly - 2 sets - 10 reps - 3 hold  - Hooklying Heel Slide  - 1-2 x daily - 7 x weekly - 2 sets - 10 reps  - Abdominal Press into Ball  - 1-2 x daily - 7 x weekly - 2 sets - 10 reps   Timed Minutes      Total Timed Treatment:  40  mins  Total Time of Visit:           40   mins         ASSESSMENT/PLAN     GOALS  Goals                                    Progress Note due by 5/23/24                                                    Recert due by 6/16/24   STG by: 4 weeks Comments Date Status   Improve mobility through thoracic and CT junction Mod hypomobility 4/23 ongoing   Decreased muscle guarding  throughout neck and shoulder girdle Gradually decreasing 4/23 ongoing    Decreased muscle guarding  throughout gluteals and piriformis Not painful or significantly guarded today  2/13 MET   LTG by: 8 weeks         Reports no radicular symptoms for a week None today or within the last week  2/20 MET   Improve cervical rotation preeti to 60 deg with no pain L 48 deg R 38 deg on 1/23/24  L 52 deg R 49 deg on 2/20/24  L 65 deg; R 60 deg on 3/19/24 after manual 3/19 MET   Able to return from fwd lumbar flexion to neutral without gowers maneuver Reports feeling pressure in lumbar, though able to return to neutral without compensation  1/23 MET   Understands improved ergonomics for work and HEP for flexibility and posture She reports compliance 2x/week 4/23 ongoing    Improve NDI score to 10 or less  27 on 1/23/2024   23 on 2/20/2024   27 on 3/19   19 on 4/23 4/23 ongoing    Improve RENETTA score to 10 or less  29/50 on 1/23/2024   25/50 on 2/20/2024  4/50 on 3/19 3/19 MET     Assessment/Plan     ASSESSMENT:  Her symptoms are not always the same which is typical for the combination of issues she has. She was having more specific pain consistent with SI on the R today and did respond well to the belt. She can wear this on as needed basis when that is flared. Spent most time on balance. When she is concentrating, she is able to maintain balance even on pliable surfaces. With distraction, she has to make more balance corrections. Also discussed possible benefit from weighted blanket use at home to calm the nervous system.    PLAN: Continue with post-concussive rehab, addressing mm guarding in cervicothoracic region as needed. May consider BITS machine for rotational components.       SIGNATURE: Suzette Lutz, PT, DPT, [place] [facility] License #: [id number]  Electronically Signed on 4/30/2024            [address] [place] [address], [place] [address]  [phone]

## 2024-05-13 ENCOUNTER — OFFICE VISIT (OUTPATIENT)
Dept: INTERNAL MEDICINE | Facility: CLINIC | Age: 49
End: 2024-05-13
Payer: MEDICARE

## 2024-05-13 VITALS
WEIGHT: 140.4 LBS | DIASTOLIC BLOOD PRESSURE: 80 MMHG | RESPIRATION RATE: 16 BRPM | HEIGHT: 63 IN | SYSTOLIC BLOOD PRESSURE: 125 MMHG | BODY MASS INDEX: 24.88 KG/M2 | OXYGEN SATURATION: 98 % | HEART RATE: 70 BPM

## 2024-05-13 DIAGNOSIS — R29.818 SUSPECTED SLEEP APNEA: ICD-10-CM

## 2024-05-13 DIAGNOSIS — F41.0 PANIC ATTACKS: ICD-10-CM

## 2024-05-13 DIAGNOSIS — F41.1 GENERALIZED ANXIETY DISORDER: Primary | ICD-10-CM

## 2024-05-13 DIAGNOSIS — R10.13 DYSPEPSIA: ICD-10-CM

## 2024-05-13 DIAGNOSIS — R06.89 GASPING FOR BREATH: ICD-10-CM

## 2024-05-13 DIAGNOSIS — G47.10 HYPERSOMNOLENCE: ICD-10-CM

## 2024-05-13 PROCEDURE — G2211 COMPLEX E/M VISIT ADD ON: HCPCS | Performed by: INTERNAL MEDICINE

## 2024-05-13 PROCEDURE — 1159F MED LIST DOCD IN RCRD: CPT | Performed by: INTERNAL MEDICINE

## 2024-05-13 PROCEDURE — 1160F RVW MEDS BY RX/DR IN RCRD: CPT | Performed by: INTERNAL MEDICINE

## 2024-05-13 PROCEDURE — 99214 OFFICE O/P EST MOD 30 MIN: CPT | Performed by: INTERNAL MEDICINE

## 2024-05-13 RX ORDER — VENLAFAXINE HYDROCHLORIDE 75 MG/1
75 CAPSULE, EXTENDED RELEASE ORAL DAILY
Qty: 90 CAPSULE | Refills: 1 | Status: SHIPPED | OUTPATIENT
Start: 2024-05-13

## 2024-05-13 RX ORDER — BUSPIRONE HYDROCHLORIDE 10 MG/1
20 TABLET ORAL 2 TIMES DAILY
Qty: 120 TABLET | Refills: 1 | Status: SHIPPED | OUTPATIENT
Start: 2024-05-13

## 2024-05-13 RX ORDER — FAMOTIDINE 20 MG/1
20 TABLET, FILM COATED ORAL 2 TIMES DAILY PRN
Qty: 60 TABLET | Refills: 3 | Status: SHIPPED | OUTPATIENT
Start: 2024-05-13

## 2024-05-13 NOTE — PROGRESS NOTES
CC: stomach upset    History:  Mercedes Amezquita is a 48 y.o. female   She notes stomach upset that has been prsent despite H2RB. She also notes symptoms of morning headaches, awakening gasping for air and is very concerned she could have sleep apnea.     [place]:  Sitting and Readin  Watching TV: 3  Sitting, inactive in a public place: 1  As a passenger in a car for an hour without a break: 0  Lying down to rest in the afternoon: 3  Sitting and talking to someone: 0  Sitting quietly after a lunch without alcohol: 3  In a car, while stopped for a few minutes in traffic: 0  Total: 12        ROS:  Review of Systems   Respiratory:  Negative for shortness of breath.    Cardiovascular:  Negative for chest pain.        reports that she has never smoked. She has never been exposed to tobacco smoke. She has never used smokeless tobacco. She reports that she does not drink alcohol and does not use drugs.      Current Outpatient Medications:     busPIRone (BUSPAR) 10 MG tablet, Take 2 tablets by mouth 2 (Two) Times a Day., Disp: 120 tablet, Rfl: 1    butalbital-acetaminophen-caffeine (ORBIVAN) -40 MG capsule capsule, TAKE 1 CAPSULE BY MOUTH EVERY 4 TO 6 HOURS AS NEEDED, Disp: , Rfl:     diclofenac (VOLTAREN) 75 MG EC tablet, Take 1 tablet by mouth 2 (Two) Times a Day With Meals., Disp: , Rfl:     fluticasone (FLONASE) 50 MCG/ACT nasal spray, 2 sprays into the nostril(s) as directed by provider Daily., Disp: 16 g, Rfl: 0    polyethylene glycol (MiraLax) 17 GM/SCOOP powder, Take 17 g by mouth Daily., Disp: 850 g, Rfl: 0    pregabalin (LYRICA) 50 MG capsule, Take 1 capsule by mouth 2 (Two) Times a Day., Disp: , Rfl:     Sennosides (Senna) 8.6 MG capsule, Take 17.2 mg by mouth 2 (Two) Times a Day As Needed (constipation)., Disp: 30 each, Rfl: 3    tamsulosin (FLOMAX) 0.4 MG capsule 24 hr capsule, Take 1 capsule by mouth Every Morning., Disp: , Rfl:     tiZANidine (ZANAFLEX) 4 MG tablet, Take 1 tablet by mouth Every 8  "(Eight) Hours As Needed., Disp: , Rfl:     topiramate (TOPAMAX) 25 MG tablet, Take 1 tablet by mouth As Needed., Disp: , Rfl:     traMADol (ULTRAM) 50 MG tablet, Take 1 tablet by mouth As Needed., Disp: , Rfl:     venlafaxine XR (Effexor XR) 75 MG 24 hr capsule, Take 1 capsule by mouth Daily., Disp: 90 capsule, Rfl: 1    famotidine (Pepcid) 20 MG tablet, Take 1 tablet by mouth 2 (Two) Times a Day As Needed for Heartburn., Disp: 60 tablet, Rfl: 3    OBJECTIVE:  /80 (BP Location: Left arm, Patient Position: Sitting, Cuff Size: Adult)   Pulse 70   Resp 16   Ht 160 cm (63\")   Wt 63.7 kg (140 lb 6.4 oz)   LMP  (LMP Unknown)   SpO2 98%   BMI 24.87 kg/m²    Physical Exam  Constitutional:       General: She is not in acute distress.  Cardiovascular:      Rate and Rhythm: Normal rate and regular rhythm.      Heart sounds: Normal heart sounds. No murmur heard.  Pulmonary:      Effort: Pulmonary effort is normal.      Breath sounds: Normal breath sounds. No wheezing.   Neurological:      Mental Status: She is alert and oriented to person, place, and time.      Gait: Gait normal.   Psychiatric:         Mood and Affect: Mood normal.         Behavior: Behavior normal.         Assessment/Plan     Diagnoses and all orders for this visit:    1. Generalized anxiety disorder (Primary)  2. Panic attacks  -     venlafaxine XR (Effexor XR) 75 MG 24 hr capsule; Take 1 capsule by mouth Daily.  Dispense: 90 capsule; Refill: 1  -     busPIRone (BUSPAR) 10 MG tablet; Take 2 tablets by mouth 2 (Two) Times a Day.  Dispense: 120 tablet; Refill: 1  Fair control. Continue Effexor and Buspar.     3. Dyspepsia  -     famotidine (Pepcid) 20 MG tablet; Take 1 tablet by mouth 2 (Two) Times a Day As Needed for Heartburn.  Dispense: 60 tablet; Refill: 3  Continue H2RB as needed.     4. Suspected sleep apnea  5. Gasping for breath  6. Hypersomnolence  -     Home Sleep Study; Future  Home sleep study ordered for further evaluation.       An " After Visit Summary was printed and given to the patient at discharge.  Return in about 6 months (around 11/13/2024) for Medicare Wellness.      Krishna Villasenor D.O. 5/16/2024   Electronically signed.

## 2024-06-04 ENCOUNTER — TREATMENT (OUTPATIENT)
Dept: PHYSICAL THERAPY | Facility: CLINIC | Age: 49
End: 2024-06-04
Payer: MEDICARE

## 2024-06-04 DIAGNOSIS — S13.4XXS WHIPLASH INJURY TO NECK, SEQUELA: Primary | ICD-10-CM

## 2024-06-04 DIAGNOSIS — M54.42 ACUTE BILATERAL LOW BACK PAIN WITH BILATERAL SCIATICA: ICD-10-CM

## 2024-06-04 DIAGNOSIS — M54.6 THORACIC SPINE PAIN: ICD-10-CM

## 2024-06-04 DIAGNOSIS — M54.41 ACUTE BILATERAL LOW BACK PAIN WITH BILATERAL SCIATICA: ICD-10-CM

## 2024-06-04 PROCEDURE — 97112 NEUROMUSCULAR REEDUCATION: CPT

## 2024-06-04 PROCEDURE — 97530 THERAPEUTIC ACTIVITIES: CPT

## 2024-06-04 PROCEDURE — 97140 MANUAL THERAPY 1/> REGIONS: CPT

## 2024-06-04 NOTE — PROGRESS NOTES
Physical Therapy Treatment Note, 30 Day Progress Note, and 90 Day Recertification Note  [address] [place] [address]    Patient: Mercedes Amezquita                                                 Visit Date: 2024  :     1975    Referring practitioner:    Shay Espinal MD  Date of Initial Visit:          Type: THERAPY  Noted: 2023    Patient seen for 19 sessions    Visit Diagnoses:    ICD-10-CM ICD-9-CM   1. Whiplash injury to neck, sequela  S13.4XXS 905.7   2. Acute bilateral low back pain with bilateral sciatica  M54.42 724.2    M54.41 724.3   3. Thoracic spine pain  M54.6 724.1           SUBJECTIVE     Subjective: She reports that she's doing a lot better today. Nothing is really agitated today. She knows that she is not at 100% yet, though notes that she is definitely doing better with the wobbliness. She can tell she is stronger and is more confident with holding her new grandbaby. Her neck issues have improved and her swallowing and headaches aren't as bad as they were. She can tell that PTx has improved her overall quality of life.       PAIN: 5/10 (low back)      OBJECTIVE     Objective     BITS   Neuromuscular Reeducation     91766   Activity  Parameters Reaction Time Accuracy Comments   Visual pursuit rotation on lateral wobble board + AirEx  Numbers and letters 7.66 sec 74.47% Got lost a couple times   Visual pursuit rotation on AP wobble board + AirEx  Numbers and letters 7.46 sec 81.4% Got lost at 7                  Timed Minutes 22     Therapeutic Activities    01439 Comments   All goals assessed for PN     RENETTA    NDI            Timed Minutes 15     Manual Therapy     22079  Comments   Prone CT PA mobs     STM to cervical paraspinals                 Timed Minutes 8        Therapy Education/Self Care 45831   Education offered today Use of SI belt. Try weighted blanket to calm nervous system: 10-12 lb.   [employer]  Code [id number]; [id number] (post-concussive HEP)   Ongoing HEP   Date: 02/20/2024  Prepared by: Jenny Alejandre    Exercises  - Sternocleidomastoid Stretch  - 1 x daily - 7 x weekly - 4 reps - 15-30 sec hold  - Seated Cervical Sidebending Stretch  - 1 x daily - 7 x weekly - 4 reps - 15-30 sec hold  - Seated Scapular Retraction  - 1-2 x daily - 7 x weekly - 2 sets - 10 reps  - Supine Piriformis Stretch with Foot on Ground  - 1 x daily - 7 x weekly - 3 reps - 30 hold  - Hooklying Single Knee to Chest Stretch  - 1 x daily - 7 x weekly - 3 reps - 30 hold  - Supine Lower Trunk Rotation  - 1 x daily - 7 x weekly - 2 sets - 10 reps - 3 hold  - Hooklying Heel Slide  - 1-2 x daily - 7 x weekly - 2 sets - 10 reps  - Abdominal Press into Ball  - 1-2 x daily - 7 x weekly - 2 sets - 10 reps   Timed Minutes      Total Timed Treatment:  45  mins  Total Time of Visit:           45   mins         ASSESSMENT/PLAN     GOALS  Goals                                    Progress Note due by 5/23/24                                                    Recert due by 6/16/24   STG by: 4 weeks Comments Date Status   Improve mobility through thoracic and CT junction Mild hypomobility throughout upper thoracic, mod hypomobile  4/23 ongoing   Decreased muscle guarding  throughout neck and shoulder girdle Greatly decreased  6/4 MET    Decreased muscle guarding  throughout gluteals and piriformis Not painful or significantly guarded today  2/13 MET   LTG by: 8 weeks         Reports no radicular symptoms for a week None today or within the last week  2/20 MET   Improve cervical rotation preeti to 60 deg with no pain L 48 deg R 38 deg on 1/23/24  L 52 deg R 49 deg on 2/20/24  L 65 deg; R 60 deg on 3/19/24 after manual 3/19 MET   Able to return from fwd lumbar flexion to neutral without gowers maneuver Reports feeling pressure in lumbar, though able to return to neutral without compensation  1/23 MET   Understands improved ergonomics for work and HEP for  flexibility and posture Did not perform this past week, though compliant 2x/wk before  6/4 ongoing    Improve NDI score to 10 or less  27 on 1/23/2024   23 on 2/20/2024   27 on 3/19   19 on 4/23 6/4 ongoing    Improve RENETTA score to 10 or less  29/50 on 1/23/2024   25/50 on 2/20/2024  4/50 on 3/19 3/19 MET     Assessment & Plan       Assessment  Impairments: abnormal coordination, abnormal muscle tone, abnormal or restricted ROM, activity intolerance, impaired physical strength, lacks appropriate home exercise program and pain with function   Functional limitations: carrying objects, lifting, sleeping, walking, pulling, uncomfortable because of pain, moving in bed, standing, stooping, reaching overhead and unable to perform repetitive tasks   Assessment details:     Prognosis: good    Plan  Therapy options: will be seen for skilled therapy services  Planned modality interventions: cryotherapy, dry needling, electrical stimulation/Russian stimulation, TENS, low level laser therapy, iontophoresis, ultrasound and thermotherapy (hydrocollator packs)  Planned therapy interventions: abdominal trunk stabilization, ADL retraining, body mechanics training, fine motor coordination training, flexibility, functional ROM exercises, home exercise program, joint mobilization, manual therapy, motor coordination training, neuromuscular re-education, postural training, soft tissue mobilization, spinal/joint mobilization, strengthening, stretching and therapeutic activities  Frequency: 2x week  Duration in weeks: 8         ASSESSMENT: Goals assessed for Progress Note  and recert today. One additional goal met for decreased muscle guarding throughout neck and shoulders, for a total of 2/3 or 4/6 goals met at this time. Overall, her balance and confidence have improved a great deal, to where she is much more confident with holding her new grandbaby. Her neck issues have improves as well, per subjective report, to include swallowing and  headaches. She does continue to have some pain in her back and neck, however not like it was at first. Introduced use of BITS  for rotational component +  balance challenges of AP/lateral wobble board today. The Pt would benefit from skilled PTx to decrease pain, improve functional mobility, progress toward goals, and increase overall quality of life.      PLAN: Continue with post-concussive rehab, addressing mm guarding in cervicothoracic region as needed. May consider BITS machine for rotational components.       Clinical Progress: improved  Home Program Compliance: Yes, intermittently  Progress toward previous goals: Partially Met      90 Day Recertification  Certification Period: 6/4/2024 through 9/1/2024  I certify that the therapy services are furnished while this patient is under my care.  The services outlined above are required by this patient, and will be reviewed every 90 days.     PHYSICIAN: Shay Espinal MD (NPI: [id number])    Signature:___________________________________________DATE: _________    Please sign and return via fax to [phone].   [email or web address]           SIGNATURE: Pauline Smith PT [place] License #: [id number]    Electronically Signed on 6/4/2024            [place]. [address]  [phone]

## 2024-06-11 ENCOUNTER — TREATMENT (OUTPATIENT)
Dept: PHYSICAL THERAPY | Facility: CLINIC | Age: 49
End: 2024-06-11
Payer: MEDICARE

## 2024-06-11 DIAGNOSIS — S13.4XXS WHIPLASH INJURY TO NECK, SEQUELA: Primary | ICD-10-CM

## 2024-06-11 DIAGNOSIS — M54.42 ACUTE BILATERAL LOW BACK PAIN WITH BILATERAL SCIATICA: ICD-10-CM

## 2024-06-11 DIAGNOSIS — M54.41 ACUTE BILATERAL LOW BACK PAIN WITH BILATERAL SCIATICA: ICD-10-CM

## 2024-06-11 DIAGNOSIS — M54.6 THORACIC SPINE PAIN: ICD-10-CM

## 2024-06-11 NOTE — PROGRESS NOTES
Physical Therapy Treatment Note  [address] [place] [address]    Patient: Mercedes Amezquita                                                 Visit Date: 2024  :     1975    Referring practitioner:    Shay Espinal MD  Date of Initial Visit:          Type: THERAPY  Noted: 2023    Patient seen for 20 sessions    Visit Diagnoses:    ICD-10-CM ICD-9-CM   1. Whiplash injury to neck, sequela  S13.4XXS 905.7   2. Acute bilateral low back pain with bilateral sciatica  M54.42 724.2    M54.41 724.3   3. Thoracic spine pain  M54.6 724.1           SUBJECTIVE     Subjective: She [place] the [place] and she was really sore after. The car ride was rough. Sitting for long periods of time and walking for long periods of time was not great. Her low back is the worst today.    PAIN: 5/10 (low back)      OBJECTIVE     Objective     Neuromuscular Reeducation     79674 Comments   Squat to ball toss to treadmill while standing on AirEx    Tall kneeling hip thrust to ball toss on treadmill                 Timed Minutes 20     Therapeutic Exercises    80171 Units Comments   DKTC with yellow PB  3 min     LTR with yellow PB  3 min     Open book with lumbar component  X15 ea              Timed Minutes 10     Manual Therapy     02096  Comments   Lumbar rotational mobs     STM to lumbar paraspinals                 Timed Minutes 8         Therapy Education/Self Care 73731   Education offered today Use of SI belt. Try weighted blanket to calm nervous system: 10-12 lb.   [employer] Code [id number]; [id number] (post-concussive HEP)   Ongoing HEP   Date: 2024  Prepared by: Jenny Alejandre    Exercises  - Sternocleidomastoid Stretch  - 1 x daily - 7 x weekly - 4 reps - 15-30 sec hold  - Seated Cervical Sidebending Stretch  - 1 x daily - 7 x weekly - 4 reps - 15-30 sec hold  - Seated Scapular Retraction  - 1-2 x daily - 7 x weekly - 2 sets - 10 reps  - Supine  Piriformis Stretch with Foot on Ground  - 1 x daily - 7 x weekly - 3 reps - 30 hold  - Hooklying Single Knee to Chest Stretch  - 1 x daily - 7 x weekly - 3 reps - 30 hold  - Supine Lower Trunk Rotation  - 1 x daily - 7 x weekly - 2 sets - 10 reps - 3 hold  - Hooklying Heel Slide  - 1-2 x daily - 7 x weekly - 2 sets - 10 reps  - Abdominal Press into Ball  - 1-2 x daily - 7 x weekly - 2 sets - 10 reps   Timed Minutes      Total Timed Treatment:  38  mins  Total Time of Visit:         38   mins         ASSESSMENT/PLAN     GOALS  Goals                                    Progress Note due by 5/23/24                                                    Recert due by 6/16/24   STG by: 4 weeks Comments Date Status   Improve mobility through thoracic and CT junction Mild hypomobility throughout upper thoracic, mod hypomobile  4/23 ongoing   Decreased muscle guarding  throughout neck and shoulder girdle Greatly decreased  6/4 MET    Decreased muscle guarding  throughout gluteals and piriformis Not painful or significantly guarded today  2/13 MET   LTG by: 8 weeks         Reports no radicular symptoms for a week None today or within the last week  2/20 MET   Improve cervical rotation preeti to 60 deg with no pain L 48 deg R 38 deg on 1/23/24  L 52 deg R 49 deg on 2/20/24  L 65 deg; R 60 deg on 3/19/24 after manual 3/19 MET   Able to return from fwd lumbar flexion to neutral without gowers maneuver Reports feeling pressure in lumbar, though able to return to neutral without compensation  1/23 MET   Understands improved ergonomics for work and HEP for flexibility and posture Did not perform this past week, though compliant 2x/wk before  6/4 ongoing    Improve NDI score to 10 or less  27 on 1/23/2024   23 on 2/20/2024   27 on 3/19   19 on 4/23 6/4 ongoing    Improve RENETTA score to 10 or less  29/50 on 1/23/2024   25/50 on 2/20/2024  4/50 on 3/19 3/19 MET     Assessment/Plan     ASSESSMENT: Pt continuing to progress well, though still  has difficulty with sitting or walking for a prolonged period of time as she discovered following her trip to the [facility] this past weekend. She continues to have deficits in lumbar ROM, though is progressing well. She reported feeling like it loosened up following today's session.       PLAN: Continue with post-concussive rehab, addressing mm guarding in cervicothoracic and lumbar region as needed.        SIGNATURE: Pauline Smith PT DPT, [place] License #: [id number]    Electronically Signed on 6/11/2024            [address] [place] [address] [place] [address]  [phone]

## 2024-06-18 ENCOUNTER — TREATMENT (OUTPATIENT)
Dept: PHYSICAL THERAPY | Facility: CLINIC | Age: 49
End: 2024-06-18
Payer: MEDICARE

## 2024-06-18 DIAGNOSIS — M54.6 THORACIC SPINE PAIN: ICD-10-CM

## 2024-06-18 DIAGNOSIS — M54.41 ACUTE BILATERAL LOW BACK PAIN WITH BILATERAL SCIATICA: ICD-10-CM

## 2024-06-18 DIAGNOSIS — S13.4XXS WHIPLASH INJURY TO NECK, SEQUELA: Primary | ICD-10-CM

## 2024-06-18 DIAGNOSIS — M54.42 ACUTE BILATERAL LOW BACK PAIN WITH BILATERAL SCIATICA: ICD-10-CM
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
## 2024-06-24 ENCOUNTER — HOSPITAL ENCOUNTER (OUTPATIENT)
Dept: SLEEP CENTER | Age: 49
Discharge: HOME | End: 2024-06-26
Payer: MEDICARE

## 2024-06-24 PROCEDURE — G0399 HOME SLEEP TEST/TYPE 3 PORTA: HCPCS

## 2024-06-25 ENCOUNTER — TREATMENT (OUTPATIENT)
Dept: PHYSICAL THERAPY | Facility: CLINIC | Age: 49
End: 2024-06-25
Payer: MEDICARE

## 2024-06-25 DIAGNOSIS — M54.42 ACUTE BILATERAL LOW BACK PAIN WITH BILATERAL SCIATICA: ICD-10-CM

## 2024-06-25 DIAGNOSIS — M54.41 ACUTE BILATERAL LOW BACK PAIN WITH BILATERAL SCIATICA: ICD-10-CM

## 2024-06-25 DIAGNOSIS — S13.4XXS WHIPLASH INJURY TO NECK, SEQUELA: Primary | ICD-10-CM

## 2024-06-25 DIAGNOSIS — M54.6 THORACIC SPINE PAIN: ICD-10-CM

## 2024-06-25 PROCEDURE — G0399 HOME SLEEP TEST/TYPE 3 PORTA: HCPCS
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
## 2024-07-05 ENCOUNTER — TELEPHONE (OUTPATIENT)
Dept: SLEEP CENTER | Age: 49
End: 2024-07-05

## 2024-07-05 NOTE — TELEPHONE ENCOUNTER
Spoke to Ms.Latasha Chavez regarding his/her HST performed  06/24/2024 and 06/25/2024.  The HST revealed an AHI within normal limits.   was advised to follow-up with her ordering provider, Montrell Galindo D.O,  and discuss the recommendations made by the interpreting physician.

## 2024-07-09 DIAGNOSIS — R06.83 PRIMARY SNORING: Primary | ICD-10-CM

## 2024-11-01 ENCOUNTER — HOSPITAL ENCOUNTER (EMERGENCY)
Facility: HOSPITAL | Age: 49
Discharge: HOME | End: 2024-11-01
Payer: MEDICARE

## 2024-11-01 ENCOUNTER — APPOINTMENT (OUTPATIENT)
Dept: CT IMAGING | Facility: HOSPITAL | Age: 49
End: 2024-11-01
Payer: MEDICARE

## 2024-11-01 VITALS
BODY MASS INDEX: 25.6 KG/M2 | HEIGHT: 63 IN | RESPIRATION RATE: 18 BRPM | HEART RATE: 90 BPM | WEIGHT: 144.5 LBS | SYSTOLIC BLOOD PRESSURE: 125 MMHG | DIASTOLIC BLOOD PRESSURE: 59 MMHG | OXYGEN SATURATION: 100 % | TEMPERATURE: 97.7 F

## 2024-11-01 DIAGNOSIS — S29.019A THORACIC MYOFASCIAL STRAIN, INITIAL ENCOUNTER: ICD-10-CM

## 2024-11-01 DIAGNOSIS — N20.0 LEFT RENAL STONE: Primary | ICD-10-CM

## 2024-11-01 DIAGNOSIS — M62.838 CERVICAL PARASPINOUS MUSCLE SPASM: ICD-10-CM
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
## 2024-11-01 PROCEDURE — 99284 EMERGENCY DEPT VISIT MOD MDM: CPT

## 2024-11-01 PROCEDURE — 72128 CT CHEST SPINE W/O DYE: CPT

## 2024-11-01 PROCEDURE — 72131 CT LUMBAR SPINE W/O DYE: CPT

## 2024-11-01 PROCEDURE — 25010000002 KETOROLAC TROMETHAMINE PER 15 MG: Performed by: PHYSICIAN ASSISTANT

## 2024-11-01 PROCEDURE — 63710000001 ONDANSETRON ODT 4 MG TABLET DISPERSIBLE: Performed by: PHYSICIAN ASSISTANT

## 2024-11-01 PROCEDURE — 25010000002 ORPHENADRINE CITRATE PER 60 MG: Performed by: PHYSICIAN ASSISTANT

## 2024-11-01 PROCEDURE — 72125 CT NECK SPINE W/O DYE: CPT

## 2024-11-01 PROCEDURE — 96372 THER/PROPH/DIAG INJ SC/IM: CPT

## 2024-11-01 PROCEDURE — 70450 CT HEAD/BRAIN W/O DYE: CPT

## 2024-11-01 RX ORDER — NAPROXEN 500 MG/1
500 TABLET ORAL 2 TIMES DAILY PRN
Qty: 10 TABLET | Refills: 0 | Status: SHIPPED | OUTPATIENT
Start: 2024-11-01

## 2024-11-01 RX ORDER — KETOROLAC TROMETHAMINE 30 MG/ML
30 INJECTION, SOLUTION INTRAMUSCULAR; INTRAVENOUS ONCE
Status: COMPLETED | OUTPATIENT
Start: 2024-11-01 | End: 2024-11-01

## 2024-11-01 RX ORDER — ONDANSETRON 4 MG/1
4 TABLET, ORALLY DISINTEGRATING ORAL ONCE
Status: COMPLETED | OUTPATIENT
Start: 2024-11-01 | End: 2024-11-01

## 2024-11-01 RX ORDER — HYDROCODONE BITARTRATE AND ACETAMINOPHEN 10; 325 MG/1; MG/1
1 TABLET ORAL ONCE
Status: COMPLETED | OUTPATIENT
Start: 2024-11-01 | End: 2024-11-01

## 2024-11-01 RX ORDER — ORPHENADRINE CITRATE 30 MG/ML
60 INJECTION INTRAMUSCULAR; INTRAVENOUS ONCE
Status: COMPLETED | OUTPATIENT
Start: 2024-11-01 | End: 2024-11-01

## 2024-11-01 RX ORDER — LIDOCAINE 50 MG/G
1 PATCH TOPICAL EVERY 24 HOURS
Qty: 30 EACH | Refills: 0 | Status: SHIPPED | OUTPATIENT
Start: 2024-11-01

## 2024-11-01 RX ORDER — LORAZEPAM 1 MG/1
1 TABLET ORAL ONCE
Status: COMPLETED | OUTPATIENT
Start: 2024-11-01 | End: 2024-11-01

## 2024-11-01 RX ORDER — ACETAMINOPHEN 500 MG
1000 TABLET ORAL ONCE
Status: DISCONTINUED | OUTPATIENT
Start: 2024-11-01 | End: 2024-11-01

## 2024-11-01 RX ADMIN — LORAZEPAM 1 MG: 1 TABLET ORAL at 17:55

## 2024-11-01 RX ADMIN — ONDANSETRON 4 MG: 4 TABLET, ORALLY DISINTEGRATING ORAL at 17:54

## 2024-11-01 RX ADMIN — KETOROLAC TROMETHAMINE 30 MG: 30 INJECTION, SOLUTION INTRAMUSCULAR; INTRAVENOUS at 17:56

## 2024-11-01 RX ADMIN — HYDROCODONE BITARTRATE AND ACETAMINOPHEN 1 TABLET: 10; 325 TABLET ORAL at 17:55

## 2024-11-01 RX ADMIN — ORPHENADRINE CITRATE 60 MG: 30 INJECTION, SOLUTION INTRAMUSCULAR; INTRAVENOUS at 17:55
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
## 2024-11-02 NOTE — DISCHARGE INSTRUCTIONS
As we discussed you will likely be more sore today and tomorrow due to the mechanism of your injury.  Please apply heat to your neck and back followed by gentle range of motion stretching.  Topically may use lidocaine patches or Biofreeze.  Please use your choice of over-the-counter anti-inflammatory or the muscle relaxers.    Incidentally you have a 4 mm kidney stone within your left kidney which you will need to follow-up with your primary care provider.    Please follow-up with your primary care provider within the next 48 hours for close reevaluation however should you develop any new or worsening symptoms please return to the ER for further evaluation.

## 2024-11-08 ENCOUNTER — APPOINTMENT (OUTPATIENT)
Dept: CT IMAGING | Facility: HOSPITAL | Age: 49
End: 2024-11-08
Payer: MEDICARE

## 2024-11-08 ENCOUNTER — HOSPITAL ENCOUNTER (EMERGENCY)
Facility: HOSPITAL | Age: 49
Discharge: HOME | End: 2024-11-08
Payer: MEDICARE

## 2024-11-08 VITALS
BODY MASS INDEX: 26.13 KG/M2 | HEART RATE: 67 BPM | SYSTOLIC BLOOD PRESSURE: 121 MMHG | DIASTOLIC BLOOD PRESSURE: 75 MMHG | OXYGEN SATURATION: 99 % | RESPIRATION RATE: 18 BRPM | TEMPERATURE: 98.4 F | WEIGHT: 147.5 LBS | HEIGHT: 63 IN

## 2024-11-08 DIAGNOSIS — S09.90XA CLOSED HEAD INJURY, INITIAL ENCOUNTER: ICD-10-CM
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
## 2024-11-08 LAB
ALBUMIN SERPL-MCNC: 3.3 G/DL (ref 3.5–5.2)
ALBUMIN/GLOB SERPL: 1.1 G/DL
ALP SERPL-CCNC: 84 U/L (ref 39–117)
ALT SERPL W P-5'-P-CCNC: 8 U/L (ref 1–33)
ANION GAP SERPL CALCULATED.3IONS-SCNC: 8 MMOL/L (ref 5–15)
AST SERPL-CCNC: 13 U/L (ref 1–32)
B-HCG UR QL: NEGATIVE
BASOPHILS # BLD AUTO: 0.03 10*3/MM3 (ref 0–0.2)
BASOPHILS NFR BLD AUTO: 0.6 % (ref 0–1.5)
BILIRUB SERPL-MCNC: 0.2 MG/DL (ref 0–1.2)
BUN SERPL-MCNC: 13 MG/DL (ref 6–20)
BUN/CREAT SERPL: 22 (ref 7–25)
CALCIUM SPEC-SCNC: 8.4 MG/DL (ref 8.6–10.5)
CHLORIDE SERPL-SCNC: 106 MMOL/L (ref 98–107)
CO2 SERPL-SCNC: 24 MMOL/L (ref 22–29)
CREAT SERPL-MCNC: 0.59 MG/DL (ref 0.57–1)
DEPRECATED RDW RBC AUTO: 41 FL (ref 37–54)
EGFRCR SERPLBLD CKD-EPI 2021: 112 ML/MIN/1.73
EOSINOPHIL # BLD AUTO: 0.09 10*3/MM3 (ref 0–0.4)
EOSINOPHIL NFR BLD AUTO: 1.8 % (ref 0.3–6.2)
ERYTHROCYTE [DISTWIDTH] IN BLOOD BY AUTOMATED COUNT: 13.1 % (ref 12.3–15.4)
EXPIRATION DATE: NORMAL
GLOBULIN UR ELPH-MCNC: 3.1 GM/DL
GLUCOSE SERPL-MCNC: 89 MG/DL (ref 65–99)
HCT VFR BLD AUTO: 37.1 % (ref 34–46.6)
HGB BLD-MCNC: 11.2 G/DL (ref 12–15.9)
IMM GRANULOCYTES # BLD AUTO: 0.01 10*3/MM3 (ref 0–0.05)
IMM GRANULOCYTES NFR BLD AUTO: 0.2 % (ref 0–0.5)
INTERNAL NEGATIVE CONTROL: NEGATIVE
INTERNAL POSITIVE CONTROL: POSITIVE
LYMPHOCYTES # BLD AUTO: 1.19 10*3/MM3 (ref 0.7–3.1)
LYMPHOCYTES NFR BLD AUTO: 23.4 % (ref 19.6–45.3)
Lab: NORMAL
MCH RBC QN AUTO: 26.1 PG (ref 26.6–33)
MCHC RBC AUTO-ENTMCNC: 30.2 G/DL (ref 31.5–35.7)
MCV RBC AUTO: 86.5 FL (ref 79–97)
MONOCYTES # BLD AUTO: 0.32 10*3/MM3 (ref 0.1–0.9)
MONOCYTES NFR BLD AUTO: 6.3 % (ref 5–12)
NEUTROPHILS NFR BLD AUTO: 3.45 10*3/MM3 (ref 1.7–7)
NEUTROPHILS NFR BLD AUTO: 67.7 % (ref 42.7–76)
NRBC BLD AUTO-RTO: 0 /100 WBC (ref 0–0.2)
PLATELET # BLD AUTO: 208 10*3/MM3 (ref 140–450)
PMV BLD AUTO: 10.8 FL (ref 6–12)
POTASSIUM SERPL-SCNC: 3.9 MMOL/L (ref 3.5–5.2)
PROT SERPL-MCNC: 6.4 G/DL (ref 6–8.5)
RBC # BLD AUTO: 4.29 10*6/MM3 (ref 3.77–5.28)
SODIUM SERPL-SCNC: 138 MMOL/L (ref 136–145)
WBC NRBC COR # BLD AUTO: 5.09 10*3/MM3 (ref 3.4–10.8)

## 2024-11-08 PROCEDURE — 85025 COMPLETE CBC W/AUTO DIFF WBC: CPT | Performed by: NURSE PRACTITIONER

## 2024-11-08 PROCEDURE — 99284 EMERGENCY DEPT VISIT MOD MDM: CPT

## 2024-11-08 PROCEDURE — 70450 CT HEAD/BRAIN W/O DYE: CPT

## 2024-11-08 PROCEDURE — 81025 URINE PREGNANCY TEST: CPT | Performed by: NURSE PRACTITIONER

## 2024-11-08 PROCEDURE — 36415 COLL VENOUS BLD VENIPUNCTURE: CPT

## 2024-11-08 PROCEDURE — 93005 ELECTROCARDIOGRAM TRACING: CPT

## 2024-11-08 PROCEDURE — 93010 ELECTROCARDIOGRAM REPORT: CPT | Performed by: INTERNAL MEDICINE

## 2024-11-08 PROCEDURE — 80053 COMPREHEN METABOLIC PANEL: CPT | Performed by: NURSE PRACTITIONER
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
## 2024-11-08 NOTE — DISCHARGE INSTRUCTIONS
Stay hydrated and drink plenty of fluid.  Tylenol or motrin for pain unless you are already taking another anti inflammatory medication.  Follow closed head injury instruction sheets.  Follow up with PCP 11/12/24 as scheduled.  Return to ED if condition does not improve or worsens

## 2024-11-08 NOTE — Clinical Note
[facility] EMERGENCY DEPARTMENT  [address]  [facility] [address]  Phone: [phone]    Mercedes Amezquita was seen and treated in our emergency department on 11/8/2024.  She may return to work on 11/12/2024.         Thank you for choosing [facility].    Floyd Sousa, APRN

## 2024-11-09 LAB — QT INTERVAL: 392 MS
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
## 2024-11-12 ENCOUNTER — OFFICE VISIT (OUTPATIENT)
Dept: INTERNAL MEDICINE | Facility: CLINIC | Age: 49
End: 2024-11-12
Payer: OTHER MISCELLANEOUS

## 2024-11-12 ENCOUNTER — TELEPHONE (OUTPATIENT)
Dept: INTERNAL MEDICINE | Facility: CLINIC | Age: 49
End: 2024-11-12
Payer: MEDICARE

## 2024-11-12 VITALS
DIASTOLIC BLOOD PRESSURE: 93 MMHG | HEART RATE: 77 BPM | BODY MASS INDEX: 25.34 KG/M2 | WEIGHT: 143 LBS | HEIGHT: 63 IN | RESPIRATION RATE: 16 BRPM | TEMPERATURE: 97.3 F | SYSTOLIC BLOOD PRESSURE: 129 MMHG | OXYGEN SATURATION: 100 %

## 2024-11-12 DIAGNOSIS — S13.4XXD WHIPLASH INJURY TO NECK, SUBSEQUENT ENCOUNTER: Primary | ICD-10-CM

## 2024-11-12 PROCEDURE — 99213 OFFICE O/P EST LOW 20 MIN: CPT | Performed by: NURSE PRACTITIONER

## 2024-11-12 RX ORDER — PREDNISONE 20 MG/1
TABLET ORAL
Qty: 18 TABLET | Refills: 0 | Status: SHIPPED | OUTPATIENT
Start: 2024-11-12

## 2024-11-12 NOTE — LETTER
November 12, 2024     Patient: Mercedes ESCOBEDO Amezquita   YOB: 1975   Date of Visit: 11/12/2024       To Whom It May Concern:    It is my medical opinion that Mercedes Amezquita should be excused from work until Nov 18, 2024.            Sincerely,        DEBORAH Lorenzana    CC: No Recipients

## 2024-11-12 NOTE — LETTER
November 12, 2024     Patient: Mercedes Amezquita   YOB: 1975   Date of Visit: 11/12/2024       To Whom It May Concern:    It is my medical opinion that Mercedes Amezquita may return to work on 11/18/24 pending PT referral.          Sincerely,        DEBORAH Lorenzana    CC: No Recipients

## 2024-11-19 ENCOUNTER — TELEPHONE (OUTPATIENT)
Dept: INTERNAL MEDICINE | Facility: CLINIC | Age: 49
End: 2024-11-19
Payer: MEDICARE

## 2024-11-19 NOTE — TELEPHONE ENCOUNTER
Patient informed of Tammy's message.  Patient informed the letter has been printed and left with the front office staff for pick-up at her convenience.

## 2024-11-19 NOTE — TELEPHONE ENCOUNTER
"  Caller: Mercedes Amezquita \"Sanjuana\"    Relationship: Self    Best call back number:     [phone]     What form or medical record are you requesting: WORK EXCUSE        How would you like to receive the form or medical records (pick-up, mail, fax):   If pick-up, provide patient with address and location details    Timeframe paperwork needed: ASAP    Additional notes: PATIENT IS STILL EXPERIENCING DIZZINESS AND LOSS OF BALANCE. SHE WAS HOPING FOR A, EXTENSION ON HER WORK EXCUSE FOR ANOTHER WEEK.         "

## 2024-11-19 NOTE — TELEPHONE ENCOUNTER
Patient stated she went to an appointment yesterday at [facility]'s office and she will be going back there Monday for injections.  She has an appointment with Sam HOLT on Tuesday.

## 2024-11-26 ENCOUNTER — OFFICE VISIT (OUTPATIENT)
Dept: INTERNAL MEDICINE | Facility: CLINIC | Age: 49
End: 2024-11-26
Payer: MEDICARE

## 2024-11-26 VITALS
HEART RATE: 85 BPM | TEMPERATURE: 97.3 F | OXYGEN SATURATION: 100 % | SYSTOLIC BLOOD PRESSURE: 142 MMHG | RESPIRATION RATE: 16 BRPM | WEIGHT: 145 LBS | BODY MASS INDEX: 25.69 KG/M2 | DIASTOLIC BLOOD PRESSURE: 91 MMHG

## 2024-11-26 DIAGNOSIS — S13.4XXD WHIPLASH INJURY TO NECK, SUBSEQUENT ENCOUNTER: ICD-10-CM

## 2024-11-26 DIAGNOSIS — F41.9 ANXIETY AND DEPRESSION: ICD-10-CM

## 2024-11-26 DIAGNOSIS — F32.A ANXIETY AND DEPRESSION: ICD-10-CM

## 2024-11-26 DIAGNOSIS — F07.81 POSTCONCUSSIVE SYNDROME: ICD-10-CM

## 2024-11-26 DIAGNOSIS — Z00.00 MEDICARE ANNUAL WELLNESS VISIT, SUBSEQUENT: Primary | ICD-10-CM

## 2024-11-26 PROCEDURE — 1159F MED LIST DOCD IN RCRD: CPT | Performed by: NURSE PRACTITIONER

## 2024-11-26 PROCEDURE — 99214 OFFICE O/P EST MOD 30 MIN: CPT | Performed by: NURSE PRACTITIONER

## 2024-11-26 PROCEDURE — 1170F FXNL STATUS ASSESSED: CPT | Performed by: NURSE PRACTITIONER

## 2024-11-26 PROCEDURE — 1160F RVW MEDS BY RX/DR IN RCRD: CPT | Performed by: NURSE PRACTITIONER

## 2024-11-26 PROCEDURE — G0439 PPPS, SUBSEQ VISIT: HCPCS | Performed by: NURSE PRACTITIONER

## 2024-11-26 RX ORDER — FLUOXETINE 10 MG/1
10 CAPSULE ORAL DAILY
Qty: 30 CAPSULE | Refills: 1 | Status: SHIPPED | OUTPATIENT
Start: 2024-11-26
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
## 2024-11-26 NOTE — PATIENT INSTRUCTIONS
Medicare Wellness  Personal Prevention Plan of Service     Date of Office Visit:    Encounter Provider:  DEBORAH Lorenzana  Place of Service:  [facility]  Patient Name: Mercedes Amezquita  :  1975    As part of the Medicare Wellness portion of your visit today, we are providing you with this personalized preventive plan of services (PPPS). This plan is based upon recommendations of the [place] Preventive Services Task Force (USPSTF) and the Advisory Committee on Immunization Practices (ACIP).    This lists the preventive care services that should be considered, and provides dates of when you are due. Items listed as completed are up-to-date and do not require any further intervention.    Health Maintenance   Topic Date Due    TDAP/TD VACCINES (2 - Tdap) 2012    ANNUAL WELLNESS VISIT  2024    COVID-19 Vaccine (2024- season) 2024    INFLUENZA VACCINE  2025 (Originally 2024)    MAMMOGRAM  2025    BMI FOLLOWUP  2025    COLORECTAL CANCER SCREENING  2026    PAP SMEAR  2028    HEPATITIS C SCREENING  Completed    Pneumococcal Vaccine 0-64  Aged Out       No orders of the defined types were placed in this encounter.      Return in about 6 months (around 2025) for Recheck.
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
## 2024-12-03 ENCOUNTER — TELEPHONE (OUTPATIENT)
Dept: NEUROLOGY | Age: 49
End: 2024-12-03

## 2024-12-03 NOTE — TELEPHONE ENCOUNTER
1st attempt: Received a referral for this patient. Called and left patient a VM to call our office back to where we can get patient scheduled an appointment with Dr. Guallpa.
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
## 2024-12-10 ENCOUNTER — TELEMEDICINE (OUTPATIENT)
Dept: INTERNAL MEDICINE | Facility: CLINIC | Age: 49
End: 2024-12-10
Payer: MEDICARE

## 2024-12-10 DIAGNOSIS — F43.10 PTSD (POST-TRAUMATIC STRESS DISORDER): ICD-10-CM

## 2024-12-10 DIAGNOSIS — F41.0 PANIC ATTACKS: ICD-10-CM

## 2024-12-10 DIAGNOSIS — F41.1 GENERALIZED ANXIETY DISORDER: Primary | ICD-10-CM

## 2024-12-10 PROCEDURE — 1160F RVW MEDS BY RX/DR IN RCRD: CPT | Performed by: NURSE PRACTITIONER

## 2024-12-10 PROCEDURE — 1159F MED LIST DOCD IN RCRD: CPT | Performed by: NURSE PRACTITIONER

## 2024-12-10 PROCEDURE — G2211 COMPLEX E/M VISIT ADD ON: HCPCS | Performed by: NURSE PRACTITIONER

## 2024-12-10 PROCEDURE — 99213 OFFICE O/P EST LOW 20 MIN: CPT | Performed by: NURSE PRACTITIONER

## 2024-12-10 RX ORDER — PRAZOSIN HYDROCHLORIDE 1 MG/1
1 CAPSULE ORAL NIGHTLY
Qty: 30 CAPSULE | Refills: 0 | Status: SHIPPED | OUTPATIENT
Start: 2024-12-10

## 2024-12-10 RX ORDER — BUSPIRONE HYDROCHLORIDE 10 MG/1
20 TABLET ORAL 2 TIMES DAILY
Qty: 120 TABLET | Refills: 1 | Status: SHIPPED | OUTPATIENT
Start: 2024-12-10

## 2024-12-10 RX ORDER — FAMOTIDINE 20 MG/1
20 TABLET, FILM COATED ORAL 2 TIMES DAILY PRN
Qty: 60 TABLET | Refills: 3 | Status: SHIPPED | OUTPATIENT
Start: 2024-12-10

## 2024-12-23 ENCOUNTER — TELEMEDICINE (OUTPATIENT)
Dept: INTERNAL MEDICINE | Facility: CLINIC | Age: 49
End: 2024-12-23
Payer: MEDICARE

## 2024-12-23 DIAGNOSIS — F07.81 POST CONCUSSIVE SYNDROME: ICD-10-CM

## 2024-12-23 DIAGNOSIS — F43.10 PTSD (POST-TRAUMATIC STRESS DISORDER): ICD-10-CM

## 2024-12-23 DIAGNOSIS — R51.9 NONINTRACTABLE EPISODIC HEADACHE, UNSPECIFIED HEADACHE TYPE: ICD-10-CM

## 2024-12-23 DIAGNOSIS — R42 DIZZINESS AFTER EXTENSION OF NECK: ICD-10-CM

## 2024-12-23 DIAGNOSIS — F41.1 GENERALIZED ANXIETY DISORDER: Primary | ICD-10-CM

## 2024-12-23 PROCEDURE — 1159F MED LIST DOCD IN RCRD: CPT | Performed by: NURSE PRACTITIONER

## 2024-12-23 PROCEDURE — G2211 COMPLEX E/M VISIT ADD ON: HCPCS | Performed by: NURSE PRACTITIONER

## 2024-12-23 PROCEDURE — 99214 OFFICE O/P EST MOD 30 MIN: CPT | Performed by: NURSE PRACTITIONER

## 2024-12-23 PROCEDURE — 1160F RVW MEDS BY RX/DR IN RCRD: CPT | Performed by: NURSE PRACTITIONER

## 2024-12-23 RX ORDER — TAMSULOSIN HYDROCHLORIDE 0.4 MG/1
1 CAPSULE ORAL AS NEEDED
Qty: 30 CAPSULE | Refills: 1 | Status: CANCELLED | OUTPATIENT
Start: 2024-12-23

## 2024-12-23 RX ORDER — LIDOCAINE 50 MG/G
1 PATCH TOPICAL EVERY 24 HOURS
Qty: 30 EACH | Refills: 2 | Status: SHIPPED | OUTPATIENT
Start: 2024-12-23

## 2024-12-23 RX ORDER — PRAZOSIN HYDROCHLORIDE 1 MG/1
1 CAPSULE ORAL NIGHTLY
Qty: 30 CAPSULE | Refills: 2 | Status: SHIPPED | OUTPATIENT
Start: 2024-12-23
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
## 2024-12-23 NOTE — ASSESSMENT & PLAN NOTE
Stable, improving with treatment.  Continue BuSpar and Prozac at current doses.  Refill sent in.  Reassess at next appointment.

## 2025-01-02 ENCOUNTER — TELEPHONE (OUTPATIENT)
Dept: INTERNAL MEDICINE | Facility: CLINIC | Age: 50
End: 2025-01-02
Payer: MEDICARE

## 2025-01-02 NOTE — TELEPHONE ENCOUNTER
Patient stated she is still feeling light-headed and she is still having headaches, along with neck, back, and shoulder pain.  She said she went to a restaurant this weekend and she was having leg weakness and right arm pain/weakness.

## 2025-01-02 NOTE — TELEPHONE ENCOUNTER
"  Caller: Mercedes Amezquita \"Sanjuana\"    Relationship: Self    Best call back number: [phone]     What is the best time to reach you: ANY    Who are you requesting to speak with (clinical staff, provider,  specific staff member): CLINICAL STAFF     What was the call regarding:     PATIENT IS REQUESTING TO SPEAK TO CLINICAL STAFF REGARDING \"ISSUES\" SHE IS STILL HAVING. PATIENT STATES THESE ISSUES ARE RELATED TO HER MRI.     Is it okay if the provider responds through [facility]: PLEASE CALL   "

## 2025-01-13 ENCOUNTER — TELEPHONE (OUTPATIENT)
Dept: INTERNAL MEDICINE | Facility: CLINIC | Age: 50
End: 2025-01-13
Payer: MEDICARE

## 2025-01-13 NOTE — TELEPHONE ENCOUNTER
Please contact Dr. Espinal's office. We have not received any of their notes in communication regarding her visits there.     Their office provided her first work excuse, but reportedly did not want to extend. Is there a reason for that? We are both involved in the care, so I'm just curious to know what their discussion was and any concern why they would not want to continue with the work excuse that they started.

## 2025-01-13 NOTE — TELEPHONE ENCOUNTER
"  Caller: Mercedes Amezquita \"Sanjuana\"    Relationship: Self    Best call back number: [phone]     What is the best time to reach you: ANYTIME    Who are you requesting to speak with (clinical staff, provider,  specific staff member): PROVIDER     What was the call regarding: PATIENT STATED SHE IS NEEDING AN EXTENSION ON HER WORK EXCUSE. THE CURRENT ONE STATED FOR HER TO RETURN BACK TO WORK 01.06.2025. SHE WAS TO GO TO DR. BRODY'S OFFICE ON 01.06.2025, BUT IT WAS RESCHEDULED TO TODAY 01.13.2025 DUE TO THE WEATHER.    PATIENT STATED SHE WENT TO DR. BRODY'S OFFICE TODAY FOR HER APPOINTMENT AND STATED SHE TOLD THE PROVIDER SHE IS STILL HAVING ISSUES. THE PROVIDER SHE SEEN TODAY TOLD HER SHE DIDN'T FEEL COMFORTABLE SENDING HER BACK TO WORK AND REFERRED HER BACK TO DR. CENTENO TO GET THE WORK EXCUSE EXTENSION BECAUSE THERE IS A MRI SCHEDULED ON 01.24.2025 THAT HE ORDERED.     PATIENT STATED SHE DOES NOT GO TO SEE THE UROLOGIST UNTIL FEBRUARY. PATIENT DOES HAVE A APPOINTMENT ON 01.20.2025 WITH BRODIE HOOVER. SHOULD SHE WAIT TO DISCUSS THESE THINGS UNTIL THEN? OR SEE/SPEAK TO DR. CENTENO PRIOR TO THE MYCHART VIDEO VISIT ON 01.20.2025.    PLEASE CALL TO DISCUSS AND ADVISE.      "
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
## 2025-01-23 ENCOUNTER — TELEMEDICINE (OUTPATIENT)
Dept: INTERNAL MEDICINE | Facility: CLINIC | Age: 50
End: 2025-01-23
Payer: OTHER MISCELLANEOUS

## 2025-01-23 VITALS — HEIGHT: 63 IN | WEIGHT: 145 LBS | BODY MASS INDEX: 25.69 KG/M2

## 2025-01-23 DIAGNOSIS — F43.10 PTSD (POST-TRAUMATIC STRESS DISORDER): Primary | ICD-10-CM

## 2025-01-23 DIAGNOSIS — R42 DIZZINESS AFTER EXTENSION OF NECK: ICD-10-CM

## 2025-01-23 DIAGNOSIS — F07.81 POST CONCUSSIVE SYNDROME: ICD-10-CM

## 2025-01-23 DIAGNOSIS — S13.4XXD WHIPLASH INJURY TO NECK, SUBSEQUENT ENCOUNTER: ICD-10-CM

## 2025-01-23 PROCEDURE — 1159F MED LIST DOCD IN RCRD: CPT | Performed by: NURSE PRACTITIONER

## 2025-01-23 PROCEDURE — G2211 COMPLEX E/M VISIT ADD ON: HCPCS | Performed by: NURSE PRACTITIONER

## 2025-01-23 PROCEDURE — 1160F RVW MEDS BY RX/DR IN RCRD: CPT | Performed by: NURSE PRACTITIONER

## 2025-01-23 PROCEDURE — 99213 OFFICE O/P EST LOW 20 MIN: CPT | Performed by: NURSE PRACTITIONER

## 2025-01-23 RX ORDER — NAPROXEN 500 MG/1
500 TABLET ORAL 2 TIMES DAILY PRN
Qty: 30 TABLET | Refills: 0 | Status: SHIPPED | OUTPATIENT
Start: 2025-01-23

## 2025-01-23 RX ORDER — LIDOCAINE 50 MG/G
1 PATCH TOPICAL EVERY 24 HOURS
Qty: 30 EACH | Refills: 2 | Status: SHIPPED | OUTPATIENT
Start: 2025-01-23
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
## 2025-01-23 NOTE — LETTER
January 23, 2025     Patient: Mercedes Amezquita   YOB: 1975   Date of Visit: 1/23/2025       To Whom It May Concern:    It is my medical opinion that Mercedes Amezquita needs to remain off work at this time until her MRI of the cervical spine and brain are completed and reviewed. She is to remain off until at least 1/31/25 at this time.        Sincerely,        DEBORAH Moody    CC: No Recipients

## 2025-01-24 ENCOUNTER — HOSPITAL ENCOUNTER (OUTPATIENT)
Dept: MRI IMAGING | Facility: HOSPITAL | Age: 50
Discharge: HOME | End: 2025-01-24
Payer: MEDICARE

## 2025-01-24 DIAGNOSIS — R51.9 NONINTRACTABLE EPISODIC HEADACHE, UNSPECIFIED HEADACHE TYPE: ICD-10-CM

## 2025-01-24 DIAGNOSIS — G50.9 ABNORMALITY OF TRIGEMINAL NERVE: Primary | ICD-10-CM

## 2025-01-24 DIAGNOSIS — R42 DIZZINESS AFTER EXTENSION OF NECK: ICD-10-CM

## 2025-01-24 DIAGNOSIS — D43.3: ICD-10-CM

## 2025-01-24 DIAGNOSIS — F07.81 POST CONCUSSIVE SYNDROME: ICD-10-CM

## 2025-01-24 PROCEDURE — 70553 MRI BRAIN STEM W/O & W/DYE: CPT

## 2025-01-24 PROCEDURE — A9579 GAD-BASE MR CONTRAST NOS,1ML: HCPCS | Performed by: NURSE PRACTITIONER

## 2025-01-24 PROCEDURE — 25510000001 GADOPICLENOL 0.5 MMOL/ML SOLUTION: Performed by: NURSE PRACTITIONER

## 2025-01-24 PROCEDURE — 72141 MRI NECK SPINE W/O DYE: CPT

## 2025-01-24 RX ADMIN — GADOPICLENOL 7.5 ML: 485.1 INJECTION INTRAVENOUS at 14:26

## 2025-02-06 ENCOUNTER — OFFICE VISIT (OUTPATIENT)
Dept: NEUROLOGY | Age: 50
End: 2025-02-06
Payer: MEDICARE

## 2025-02-06 VITALS
OXYGEN SATURATION: 96 % | DIASTOLIC BLOOD PRESSURE: 68 MMHG | RESPIRATION RATE: 16 BRPM | WEIGHT: 128 LBS | SYSTOLIC BLOOD PRESSURE: 122 MMHG | HEIGHT: 63 IN | BODY MASS INDEX: 22.68 KG/M2 | HEART RATE: 68 BPM

## 2025-02-06 DIAGNOSIS — G44.89 OTHER HEADACHE SYNDROME: ICD-10-CM

## 2025-02-06 DIAGNOSIS — R53.83 OTHER FATIGUE: ICD-10-CM

## 2025-02-06 DIAGNOSIS — M54.2 NECK PAIN: Primary | ICD-10-CM

## 2025-02-06 PROCEDURE — 99204 OFFICE O/P NEW MOD 45 MIN: CPT | Performed by: PSYCHIATRY & NEUROLOGY

## 2025-02-06 PROCEDURE — G8428 CUR MEDS NOT DOCUMENT: HCPCS | Performed by: PSYCHIATRY & NEUROLOGY

## 2025-02-06 PROCEDURE — G8420 CALC BMI NORM PARAMETERS: HCPCS | Performed by: PSYCHIATRY & NEUROLOGY

## 2025-02-06 PROCEDURE — 1036F TOBACCO NON-USER: CPT | Performed by: PSYCHIATRY & NEUROLOGY

## 2025-02-06 RX ORDER — FLUOXETINE 10 MG/1
10 CAPSULE ORAL DAILY
COMMUNITY
Start: 2024-12-26 | End: 2025-02-06
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
## 2025-03-26 ENCOUNTER — OFFICE VISIT (OUTPATIENT)
Dept: INTERNAL MEDICINE | Facility: CLINIC | Age: 50
End: 2025-03-26
Payer: COMMERCIAL

## 2025-03-26 VITALS
OXYGEN SATURATION: 98 % | SYSTOLIC BLOOD PRESSURE: 117 MMHG | HEART RATE: 62 BPM | WEIGHT: 131.4 LBS | DIASTOLIC BLOOD PRESSURE: 82 MMHG | HEIGHT: 63 IN | RESPIRATION RATE: 16 BRPM | BODY MASS INDEX: 23.28 KG/M2

## 2025-03-26 DIAGNOSIS — S13.4XXD WHIPLASH INJURY TO NECK, SUBSEQUENT ENCOUNTER: Primary | ICD-10-CM

## 2025-03-26 DIAGNOSIS — F41.1 GENERALIZED ANXIETY DISORDER: ICD-10-CM

## 2025-03-26 DIAGNOSIS — F43.10 PTSD (POST-TRAUMATIC STRESS DISORDER): ICD-10-CM

## 2025-03-26 DIAGNOSIS — Z12.31 ENCOUNTER FOR SCREENING MAMMOGRAM FOR MALIGNANT NEOPLASM OF BREAST: ICD-10-CM

## 2025-03-26 DIAGNOSIS — F41.0 PANIC ATTACKS: ICD-10-CM

## 2025-03-26 RX ORDER — FAMOTIDINE 20 MG/1
20 TABLET, FILM COATED ORAL 2 TIMES DAILY PRN
Qty: 60 TABLET | Refills: 3 | Status: SHIPPED | OUTPATIENT
Start: 2025-03-26

## 2025-03-26 RX ORDER — LIDOCAINE 50 MG/G
1 PATCH TOPICAL EVERY 24 HOURS
Qty: 30 EACH | Refills: 2 | Status: SHIPPED | OUTPATIENT
Start: 2025-03-26

## 2025-03-26 RX ORDER — NAPROXEN 500 MG/1
500 TABLET ORAL 2 TIMES DAILY PRN
Qty: 30 TABLET | Refills: 0 | Status: SHIPPED | OUTPATIENT
Start: 2025-03-26

## 2025-03-26 RX ORDER — PRAZOSIN HYDROCHLORIDE 1 MG/1
1 CAPSULE ORAL NIGHTLY
Qty: 30 CAPSULE | Refills: 2 | Status: SHIPPED | OUTPATIENT
Start: 2025-03-26

## 2025-03-26 RX ORDER — BUSPIRONE HYDROCHLORIDE 10 MG/1
20 TABLET ORAL 2 TIMES DAILY
Qty: 120 TABLET | Refills: 1 | Status: SHIPPED | OUTPATIENT
Start: 2025-03-26
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
## 2025-04-10 ENCOUNTER — HOSPITAL ENCOUNTER (OUTPATIENT)
Dept: MAMMOGRAPHY | Facility: HOSPITAL | Age: 50
Discharge: HOME | End: 2025-04-10
Admitting: INTERNAL MEDICINE
Payer: MEDICARE

## 2025-04-10 DIAGNOSIS — Z12.31 ENCOUNTER FOR SCREENING MAMMOGRAM FOR MALIGNANT NEOPLASM OF BREAST: ICD-10-CM

## 2025-04-10 PROCEDURE — 77067 SCR MAMMO BI INCL CAD: CPT

## 2025-04-10 PROCEDURE — 77063 BREAST TOMOSYNTHESIS BI: CPT

## 2025-05-29 DIAGNOSIS — S13.4XXD WHIPLASH INJURY TO NECK, SUBSEQUENT ENCOUNTER: ICD-10-CM

## 2025-05-29 RX ORDER — NAPROXEN 500 MG/1
500 TABLET ORAL 2 TIMES DAILY PRN
Qty: 30 TABLET | Refills: 1 | Status: SHIPPED | OUTPATIENT
Start: 2025-05-29

## 2025-05-29 NOTE — TELEPHONE ENCOUNTER
Rx Refill Note  Requested Prescriptions     Pending Prescriptions Disp Refills    naproxen (NAPROSYN) 500 MG tablet [Pharmacy Med Name: NAPROXEN 500MG TABLETS] 30 tablet 0     Sig: TAKE 1 TABLET BY MOUTH TWICE DAILY AS NEEDED FOR MILD PAIN      Last office visit with prescribing clinician: 3/26/2025   Last telemedicine visit with prescribing clinician: 1/23/2025   Next office visit with prescribing clinician: 12/2/2025                         Would you like a call back once the refill request has been completed: [] Yes [] No    If the office needs to give you a call back, can they leave a voicemail: [] Yes [] No    Bran Castellon MA  05/29/25, 08:31 CDT

## 2025-08-28 ENCOUNTER — OFFICE VISIT (OUTPATIENT)
Dept: INTERNAL MEDICINE | Facility: CLINIC | Age: 50
End: 2025-08-28
Payer: MEDICARE

## 2025-08-28 ENCOUNTER — LAB (OUTPATIENT)
Dept: LAB | Facility: HOSPITAL | Age: 50
End: 2025-08-28
Payer: MEDICARE

## 2025-08-28 VITALS
DIASTOLIC BLOOD PRESSURE: 75 MMHG | WEIGHT: 143 LBS | BODY MASS INDEX: 25.34 KG/M2 | OXYGEN SATURATION: 100 % | TEMPERATURE: 97.7 F | SYSTOLIC BLOOD PRESSURE: 118 MMHG | RESPIRATION RATE: 16 BRPM | HEART RATE: 79 BPM | HEIGHT: 63 IN

## 2025-08-28 DIAGNOSIS — R53.82 CHRONIC FATIGUE: ICD-10-CM

## 2025-08-28 DIAGNOSIS — N20.0 NEPHROLITHIASIS: ICD-10-CM

## 2025-08-28 DIAGNOSIS — R53.82 CHRONIC FATIGUE: Primary | ICD-10-CM

## 2025-08-28 DIAGNOSIS — F41.0 PANIC ATTACKS: ICD-10-CM

## 2025-08-28 DIAGNOSIS — F41.1 GENERALIZED ANXIETY DISORDER: ICD-10-CM

## 2025-08-28 LAB
ALBUMIN SERPL-MCNC: 4 G/DL (ref 3.5–5.2)
ALBUMIN/GLOB SERPL: 1.2 G/DL
ALP SERPL-CCNC: 91 U/L (ref 39–117)
ALT SERPL W P-5'-P-CCNC: 11 U/L (ref 1–33)
ANION GAP SERPL CALCULATED.3IONS-SCNC: 7 MMOL/L (ref 5–15)
AST SERPL-CCNC: 18 U/L (ref 1–32)
BACTERIA UR QL AUTO: ABNORMAL /HPF
BASOPHILS # BLD AUTO: 0.02 10*3/MM3 (ref 0–0.2)
BASOPHILS NFR BLD AUTO: 0.6 % (ref 0–1.5)
BILIRUB SERPL-MCNC: 0.3 MG/DL (ref 0–1.2)
BILIRUB UR QL STRIP: NEGATIVE
BUN SERPL-MCNC: 8.2 MG/DL (ref 6–20)
BUN/CREAT SERPL: 11.5 (ref 7–25)
CALCIUM SPEC-SCNC: 8.9 MG/DL (ref 8.6–10.5)
CHLORIDE SERPL-SCNC: 107 MMOL/L (ref 98–107)
CLARITY UR: CLEAR
CO2 SERPL-SCNC: 25 MMOL/L (ref 22–29)
COLOR UR: YELLOW
CREAT SERPL-MCNC: 0.71 MG/DL (ref 0.57–1)
DEPRECATED RDW RBC AUTO: 43.3 FL (ref 37–54)
EGFRCR SERPLBLD CKD-EPI 2021: 105 ML/MIN/1.73
EOSINOPHIL # BLD AUTO: 0.11 10*3/MM3 (ref 0–0.4)
EOSINOPHIL NFR BLD AUTO: 3.3 % (ref 0.3–6.2)
ERYTHROCYTE [DISTWIDTH] IN BLOOD BY AUTOMATED COUNT: 13.4 % (ref 12.3–15.4)
GLOBULIN UR ELPH-MCNC: 3.3 GM/DL
GLUCOSE SERPL-MCNC: 90 MG/DL (ref 65–99)
GLUCOSE UR STRIP-MCNC: NEGATIVE MG/DL
HCT VFR BLD AUTO: 40.2 % (ref 34–46.6)
HGB BLD-MCNC: 11.9 G/DL (ref 12–15.9)
HGB UR QL STRIP.AUTO: ABNORMAL
HYALINE CASTS UR QL AUTO: ABNORMAL /LPF
IMM GRANULOCYTES # BLD AUTO: 0.01 10*3/MM3 (ref 0–0.05)
IMM GRANULOCYTES NFR BLD AUTO: 0.3 % (ref 0–0.5)
KETONES UR QL STRIP: ABNORMAL
LEUKOCYTE ESTERASE UR QL STRIP.AUTO: NEGATIVE
LYMPHOCYTES # BLD AUTO: 1.17 10*3/MM3 (ref 0.7–3.1)
LYMPHOCYTES NFR BLD AUTO: 35.6 % (ref 19.6–45.3)
MCH RBC QN AUTO: 26 PG (ref 26.6–33)
MCHC RBC AUTO-ENTMCNC: 29.6 G/DL (ref 31.5–35.7)
MCV RBC AUTO: 88 FL (ref 79–97)
MONOCYTES # BLD AUTO: 0.25 10*3/MM3 (ref 0.1–0.9)
MONOCYTES NFR BLD AUTO: 7.6 % (ref 5–12)
NEUTROPHILS NFR BLD AUTO: 1.73 10*3/MM3 (ref 1.7–7)
NEUTROPHILS NFR BLD AUTO: 52.6 % (ref 42.7–76)
NITRITE UR QL STRIP: NEGATIVE
NRBC BLD AUTO-RTO: 0 /100 WBC (ref 0–0.2)
PH UR STRIP.AUTO: 5.5 [PH] (ref 5–8)
PLATELET # BLD AUTO: 212 10*3/MM3 (ref 140–450)
PMV BLD AUTO: 10.9 FL (ref 6–12)
POTASSIUM SERPL-SCNC: 3.8 MMOL/L (ref 3.5–5.2)
PROT SERPL-MCNC: 7.3 G/DL (ref 6–8.5)
PROT UR QL STRIP: NEGATIVE
RBC # BLD AUTO: 4.57 10*6/MM3 (ref 3.77–5.28)
RBC # UR STRIP: ABNORMAL /HPF
REF LAB TEST METHOD: ABNORMAL
SODIUM SERPL-SCNC: 139 MMOL/L (ref 136–145)
SP GR UR STRIP: 1.02 (ref 1–1.03)
SQUAMOUS #/AREA URNS HPF: ABNORMAL /HPF
UROBILINOGEN UR QL STRIP: ABNORMAL
WBC # UR STRIP: ABNORMAL /HPF
WBC NRBC COR # BLD AUTO: 3.29 10*3/MM3 (ref 3.4–10.8)

## 2025-08-28 PROCEDURE — 1159F MED LIST DOCD IN RCRD: CPT | Performed by: INTERNAL MEDICINE

## 2025-08-28 PROCEDURE — 84443 ASSAY THYROID STIM HORMONE: CPT

## 2025-08-28 PROCEDURE — 99214 OFFICE O/P EST MOD 30 MIN: CPT | Performed by: INTERNAL MEDICINE

## 2025-08-28 PROCEDURE — 80053 COMPREHEN METABOLIC PANEL: CPT

## 2025-08-28 PROCEDURE — 36415 COLL VENOUS BLD VENIPUNCTURE: CPT

## 2025-08-28 PROCEDURE — 81001 URINALYSIS AUTO W/SCOPE: CPT

## 2025-08-28 PROCEDURE — 1160F RVW MEDS BY RX/DR IN RCRD: CPT | Performed by: INTERNAL MEDICINE

## 2025-08-28 PROCEDURE — 85025 COMPLETE CBC W/AUTO DIFF WBC: CPT

## 2025-08-29 LAB — TSH SERPL DL<=0.005 MIU/L-ACNC: 1.21 UIU/ML (ref 0.45–4.5)
